# Patient Record
Sex: FEMALE | Race: WHITE | NOT HISPANIC OR LATINO | Employment: FULL TIME
[De-identification: names, ages, dates, MRNs, and addresses within clinical notes are randomized per-mention and may not be internally consistent; named-entity substitution may affect disease eponyms.]

---

## 2017-07-08 ENCOUNTER — HEALTH MAINTENANCE LETTER (OUTPATIENT)
Age: 42
End: 2017-07-08

## 2018-01-19 ENCOUNTER — ONCOLOGY VISIT (OUTPATIENT)
Dept: ONCOLOGY | Facility: CLINIC | Age: 43
End: 2018-01-19
Attending: INTERNAL MEDICINE
Payer: COMMERCIAL

## 2018-01-19 VITALS
WEIGHT: 192.5 LBS | TEMPERATURE: 98.3 F | OXYGEN SATURATION: 100 % | HEART RATE: 90 BPM | DIASTOLIC BLOOD PRESSURE: 74 MMHG | HEIGHT: 72 IN | SYSTOLIC BLOOD PRESSURE: 135 MMHG | BODY MASS INDEX: 26.07 KG/M2

## 2018-01-19 DIAGNOSIS — R00.2 PALPITATIONS: ICD-10-CM

## 2018-01-19 DIAGNOSIS — Z91.89 AT HIGH RISK FOR BREAST CANCER: ICD-10-CM

## 2018-01-19 DIAGNOSIS — M85.831 OSTEOPENIA OF RIGHT FOREARM: ICD-10-CM

## 2018-01-19 DIAGNOSIS — Z09 CHEMOTHERAPY FOLLOW-UP EXAMINATION: ICD-10-CM

## 2018-01-19 DIAGNOSIS — R21 RASH AND NONSPECIFIC SKIN ERUPTION: ICD-10-CM

## 2018-01-19 PROCEDURE — G0463 HOSPITAL OUTPT CLINIC VISIT: HCPCS | Mod: ZF

## 2018-01-19 RX ORDER — AZELASTINE 1 MG/ML
1 SPRAY, METERED NASAL
COMMUNITY
Start: 2017-02-03

## 2018-01-19 RX ORDER — TRIAMCINOLONE ACETONIDE 1 MG/G
CREAM TOPICAL
COMMUNITY
Start: 2017-08-08 | End: 2020-09-18

## 2018-01-19 ASSESSMENT — PAIN SCALES - GENERAL: PAINLEVEL: NO PAIN (0)

## 2018-01-19 NOTE — MR AVS SNAPSHOT
After Visit Summary   1/19/2018    Jairo Prieto    MRN: [id number]           Patient Information     Date Of Birth          1975        Visit Information        Provider Department      1/19/2018 11:00 AM Home Brooks MD [facility]        Today's Diagnoses     Radiation exposure    -  1    Chemotherapy follow-up examination        Palpitations        At high risk for breast cancer        Osteopenia of right forearm        Rash and nonspecific skin eruption           Follow-ups after your visit        Follow-up notes from your care team     Return in about 1 year (around 1/19/2019).      Who to contact     If you have questions or need follow up information about today's clinic visit or your schedule please contact [facility] directly at No information on file..  Normal or non-critical lab and imaging results will be communicated to you by [employer], letter or phone within 4 business days after the clinic has received the results. If you do not hear from us within 7 days, please contact the clinic through [employer] LS15[employer] or phone. If you have a critical or abnormal lab result, we will notify you by phone as soon as possible.  Submit refill requests through [employer] or call your pharmacy and they will forward the refill request to us. Please allow 3 business days for your refill to be completed.          Additional Information About Your Visit        [employer] Information     [employer] gives you secure access to your electronic health record. If you see a primary care provider, you can also send messages to your care team and make appointments. If you have questions, please call your primary care clinic.  If you do not have a primary care provider, please call [phone] and they will assist you.        Care EveryWhere ID     This is your Care EveryWhere ID. This could be used by other organizations to access your [facility]  records  [id number]        Your Vitals Were     Pulse Temperature Height Last Period Pulse Oximetry BMI (Body Mass Index)    90 98.3  F (36.8  C) (Oral) 1.829 m (6') 01/02/2018 100% 26.11 kg/m2       Blood Pressure from Last 3 Encounters:   01/19/18 135/74   05/18/12 122/78    Weight from Last 3 Encounters:   01/19/18 87.3 kg (192 lb 8 oz)   05/18/12 73.4 kg (161 lb 12.8 oz)              Today, you had the following     No orders found for display         Today's Medication Changes          These changes are accurate as of: 1/19/18  1:14 PM.  If you have any questions, ask your nurse or doctor.               These medicines have changed or have updated prescriptions.        Dose/Directions    OMEGA-3 FATTY ACIDS PO   This may have changed:  Another medication with the same name was removed. Continue taking this medication, and follow the directions you see here.   Changed by:  Home Brooks MD        Dose:  2 g   Take 2 g by mouth   Refills:  0         Stop taking these medicines if you haven't already. Please contact your care team if you have questions.     ASPIRIN   Stopped by:  Home Brooks MD           folic acid 400 MCG tablet   Commonly known as:  FOLVITE   Stopped by:  Home Brooks MD                    Primary Care Provider Office Phone # Fax #    Renee Rider [phone]       [facility] [address] [facility] [address]        Equal Access to Services     [facility]: Hadii gigi mcgoverno Soeunice, waaxda luqadaha, qaybta kaalmada adeegyada, waxay derick can adekelton curran . So [facility] [phone].    ATENCIÓN: Si habla español, tiene a ramos disposición servicios gratuitos de asistencia lingüística. Llame al [phone].    We comply with applicable federal civil rights laws and [place] laws. We do not discriminate on the basis of race, color, national origin, age, disability, sex, sexual orientation, or gender identity.             Thank you!     Thank you for choosing [facility]  for your care. Our goal is always to provide you with excellent care. Hearing back from our patients is one way we can continue to improve our services. Please take a few minutes to complete the written survey that you may receive in the mail after your visit with us. Thank you!             Your Updated Medication List - Protect others around you: Learn how to safely use, store and throw away your medicines at www.disposemymeds.org.          This list is accurate as of: 1/19/18  1:14 PM.  Always use your most recent med list.                   Brand Name Dispense Instructions for use Diagnosis    azelastine 0.1 % spray    ASTELIN     1 spray        calcium-vitamin D 500-125 MG-UNIT Tabs           Cholecalciferol 83502 UNITS Tabs      Take  by mouth. 3 wk followed by 1 week, 16 weeks total    Chemotherapy follow-up examination, Radiation exposure, NHL (non-Hodgkin's lymphoma) (H), Blood transfusion, without reported diagnosis       ibuprofen 200 MG tablet    ADVIL/MOTRIN     Take 200 mg by mouth every 4 hours as needed. Approx. 2 tabs per day    Chemotherapy follow-up examination, Radiation exposure, NHL (non-Hodgkin's lymphoma) (H), Blood transfusion, without reported diagnosis       levothyroxine 150 MCG tablet    SYNTHROID/LEVOTHROID     Take 150 mcg by mouth daily.    Chemotherapy follow-up examination, Radiation exposure, NHL (non-Hodgkin's lymphoma) (H), Blood transfusion, without reported diagnosis       MULTI-VITAMIN PO      Take  by mouth. 2 tabs per day    Chemotherapy follow-up examination, Radiation exposure, NHL (non-Hodgkin's lymphoma) (H), Blood transfusion, without reported diagnosis       OMEGA-3 FATTY ACIDS PO      Take 2 g by mouth        triamcinolone 0.1 % cream    KENALOG     Apply to affected areas bid as needed itching        ZYRTEC ALLERGY 10 MG tablet   Generic drug:  cetirizine      Take 10 mg by mouth daily as  needed.    Chemotherapy follow-up examination, Radiation exposure, NHL (non-Hodgkin's lymphoma) (H), Blood transfusion, without reported diagnosis

## 2018-01-19 NOTE — LETTER
1/19/2018    RE: Jairo Prieto  [address]  [place] [address]     Dear Dr. Vogel,   I had the pleasure of seeing your patient, Jairo Prieto, at the [facility] today, January 19, 2018. As you know, she is a thriving 41 yo woman now over 31 years s/p treatment for T-Cell Lymphoblastic Lymphoma. I will summarize my assessment and recommendations below.    Cancer Diagnosis    Diagnosis:  T cell lymphoblastic lymphoma   Date of Diagnosis:  7/24/1985 Age at Diagnosis:  9 years Date Therapy Completed: 2/1987   Sites involved/stage/diagnostic details: mediastinum, CNS-, BM- Laterality:  right   Hereditary/congenital history:    Pertinent past medical history:       Treatment Center:   [facility] Medical Record #:  [id number]   MD/APN Contact Information:  Dr. Keven Lino   Relapse(s)  NONE   Subsequent Malignant Neoplasm - NONE   CANCER TREATMENT SUMMARY    Protocol     Acronym/Number Title/Description Initiated Completed On-Study   [facility] Newly diagnosed NHL 7/25/1985 2/1987 NO   Surgery    Date  Procedure Site (if applicable) Laterality (if applicable) Surgeon/Institution   7/24/1985 Fine needle aspirate Anterior mediastinum right [facility]   Chemotherapy      Drug Name Route Cumulative Dose   Cyclophosphamide  Prednisone  Vincristine  Daunorubicin  Cytarabine  Methotrexate  6 Thioguanine  L asparaginase  BCNU  Hydroxyurea IV  PO  IV  IV  IV/IT  IT/PO  PO  IM  IV  PO 6 GM/m2      360 mg/m2  5800 mg/m2 (IV)/77 mg/m2 (IT)  182 mg/m2 (IT)      270 mg/m27    **all doses are estimated**   Bioimmunotherapy - NONE   Radiation  YES   Site/Field Laterality Start Stop Fractions Dose per Fraction (cGy) Total Dose (cGy) Type   Mediastinum Anterior/posterior 11/1/1985 11/15/1985 10 150 cGy 1500 cGy    Radiation oncologist:  Keven Verdin Institution:  [facility] [place]     Transplant  None   Complications/Late Effects   "    Problem Date onset Date resolved Status   Thyroid Nodules- now s/p total thyroidectomy 5/18/2012  Levothyroxine replacement   Adverse Drug Reactions/Allergies  - NONE    Additional Information/Comments    Labs/studies to consider:  TSH yearly, needs ECHO every 1-2 years, mammography and breast MRI yearly, consider   PFTs 6/25/2015 WNL; repeat PRN  Immunization status?  Hep and HIV serologies negative in 2012     Summary prepared by:  Jessica Murray Date prepared:  5/4/2012   Summary updated by: Jessica Murray Date updated: 1/18/2018     Interval History  Ms. Jerardo Prieto is doing well but has not been seen in [facility] clinic for 6 yrs due to a change in her insurance plan. She's been well but ROS as below remarkable for palpitations nearly every night. No chest pain, dyspnea, decreased exercise tolerance. Also has had a wrist fx in past- no DEXA on record- 2 episodes of low vitamin D on lab testing. Also has had CTA of neck to work up large left external jugular vein- unremarkable study. No history of clots.  Her main reported concern is her ongoing rash of 5 mos. Had odd circular lesions on upper extremities prior to onset of pruritic generalized dermatitis. She describes the circular rash as \"vascular\" as there was no palpable skin change. I reviewed the photos on her phone- not classic livedo reticularis and not classic Lyme target lesions. Has seen Dermatology and had a biopsy showing spongioform dermatitis. Now 42, has not had breast MRI screening despite high-risk status.    PSH  Total thyroidectomy    Medications  Levothyroxine  OTC vitamin d, MVI, fish oil    Review Of Systems  Skin: rash, itching- 5 mos rash with skin biopsy showing spongioform dermatitis  Eyes: negative  Ears/Nose/Throat: sinus trouble  Respiratory: No shortness of breath, dyspnea on exertion, cough, or hemoptysis  Cardiovascular: positive for negative and palpitations  Gastrointestinal: negative  Genitourinary: " negative  Musculoskeletal: generalized aches, pains, stiffness in upper torso, neck, shoulders  Neurologic: negative  Psychiatric: negative specifically denies PTSH, MELIA, MDD symptoms  Hematologic/Lymphatic/Immunologic: negative  Endocrine: negative    Family History  PGM  of breast cancer- age 50    Social History  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 Work station not ergonomically optimized (question whether her upper body/neck myofascial symptoms relate to this)  Feels safe at home.  No tobacco or drugs.  Excess alcohol use- no FH of alcoholism. Discussed high risk markers (>14 drinks/wk or >4 drinks/d)  1 cat, 1 dog.  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    Examination  /74 (BP Location: Right arm, Patient Position: Sitting, Cuff Size: Adult Regular)  Pulse 90  Temp 98.3  F (36.8  C) (Oral)  Ht 1.829 m (6')  Wt 87.3 kg (192 lb 8 oz)  LMP 2018  SpO2 100%  BMI 26.11 kg/m2  EXAM:  Constitutional: healthy, alert and no distress  Head: Normocephalic. No masses, lesions, tenderness or abnormalities  Neck: Neck supple. No adenopathy. Thyroid symmetric, normal size,, Carotids without bruits.  ENT: ENT exam normal, no neck nodes or sinus tenderness  Cardiovascular: negative, PMI normal. No lifts, heaves, or thrills. RRR. No murmurs, clicks gallops or rub  Respiratory: negative, Percussion normal. Good diaphragmatic excursion. Lungs clear  Gastrointestinal: Abdomen soft, non-tender. BS normal. No masses, organomegaly  : Deferred  Musculoskeletal: extremities normal- no gross deformities noted, gait normal and normal muscle tone  Skin: excoriation - upper extremities, exanthem upper chest. No dermatographism but some eczematous changes on external ears.   Neurologic: Gait normal. Reflexes normal and symmetric. Sensation grossly WNL.  Psychiatric: mentation appears normal and affect  normal/bright  Hematologic/Lymphatic/Immunologic: Normal cervical lymph nodes    Assessment and Plan:  Ms. Kurtz is a healthy 43 yo seen for longterm follow-up of her treatment for Non-Hodgkins Lymphoma.  She is doing well but will need surveillance studies and on-going screening based on the treatment she received.     1)  Non-Hodgkin Lymphoma.  She is now over 30 years s/p completion of therapy so the risk of recurrence is exceedingly low.  No family history to suggest a family cancer syndrome but the early breast cancer in her paternal grandmother increases her already elevated risk.     2)  Chemotherapy late effects.  The cyclophosphamide can effect the body in many ways, but this far out from treatment the reproductive system and the urinary tract are the main systems we worry about.  Given the normal onset of menstruation and normal pubertal development, ovarian function was preserved but fertility was likely affected.  As for the urinary tract, microhematuria is common and should be checked annually with urinalysis.  Kidney dysfunction is more rare and we'd recommend annual BP screening and creatinine measurement for any hypertension.  The anthracycline exposure (daunomycin) does predispose to late cardiac effects.  The cumulative dose and coincident radiation therapy merit annual echocardiogram to make sure no cardiomyopathy develops. This  should be scheduled. In addition, the reported palpitations merit Holter monitoring in this setting. Finally, the intra-thecal methotrexate can cause changes in cognition and learning disabilities.  Ms. uKrtz has clearly done well in school and is doing well at work - she denies any PTSD, anxiety or depression symptoms. If any changes occur, we'd recommend formal Neuropsych testing. Lastly, the prednisone and MTX can affect bone development and particularly in light of her h/o wrist fracture, would recommend a baseline DEXA scan now.     3)  Radiation exposure.   Ms. Kurtz received mantle irradiation which exposes the chest, breasts, heart and lungs to radiation.  Thus, there is an increased risk of problems with these organs including second cancers and fibrosis.  At present, there is no indication of any problems.  We recommend aggressive breast cancer screening given the fact that 25% of young women receiving this radiation exposure may develop a breast cancer by age 45. In addition, her family history increases this risk further. Thus annual breast MRI is indicated and we recommend annual mammography as well (6 mos apart from the MRI).  Ms. Kurtz has fibrocystic changes in both breasts so SBE is less useful.  We also recommend avoiding sun exposure to the radiated area and close follow up of any moles in these areas.  We  ordered pulmonary function tests on entry into [facility] in 2012 and these were normal so don't need further testing unless symptoms develop.  Also, we discussed the increased risk of coronary artery disease after mantle radiation and the importance of other risk factor reduction (cholesterol and blood pressure) and daily exercise.       4)  Other  related follow-up issues.  Dental late effects are common and she's had some receding gums but o/w good dental health.  Eye issues are less common but we'd recommend a baseline eye exam and glaucoma screening. She received blood transfusions during her treatment and is Hepatitis C and HIV negative (tested in 2012)    5) Rash. The rash is pruritic and has been persistent for over 5 mos. Her h/o eczema suggests a pre-disposition to atopy and the biopsy is reassuring but if the patch testing is negative and symptoms continue, would consider a second opinion at a referral center with review of the original dermatopath sample. Lencho Reeves MD, here at the [facility] would be my recommendation. I would also consider serum tryptase measurement looking for abnormal mast cell activation.     6)  Healthcare Maintenance. Ms. Jerardo Prieto has established primary care which is critical. I would like for her to get her testing through her usual clinic if possible- but if Dr. Vogel prefers, am also happy to order it all here. She requires prior authorization for all testing.    In summary, I would recommend the following testing be ordered:    A) Breast MRI/alternating with Mammography for HIGH-RISK breast cancer screening  B) DEXA scan for risk factors for osteoporosis in setting of past wrist fx with osteopenia on Xray at that time  C) Echocardiogram annually (high risk for cardiomyopathy after XRT and anthracycline therapy) and Holter monitor for palpitations.  D) Dermatology referral to Lencho Reeves MD at [facility] with consideration of testing for Celiac disease (TTG abs) and serum Tryptase if patch testing does not reveal a clear allergic trigger.    It was a pleasure to meet Ms. Kurtz today and to be involved in her care.  Do not hesitate to contact me with any questions regarding this evaluation and my recommendations.    Sincerely,     Home Brooks MD, FACP, FAAP

## 2018-01-19 NOTE — LETTER
1/19/2018      RE: Jairo Prieto  [address] [place] [address]     Dear Colleague,    Thank you for referring your patient, Jairo Prieto, to the [facility]. Please see a copy of my visit note below.    Dear Dr. Vogel,   I had the pleasure of seeing your patient, Jairo Prieto, at the [facility] today, January 19, 2018. As you know, she is a thriving 41 yo woman now over 31 years s/p treatment for T-Cell Lymphoblastic Lymphoma. I will summarize my assessment and recommendations below.    Cancer Diagnosis    Diagnosis:  T cell lymphoblastic lymphoma   Date of Diagnosis:  7/24/1985 Age at Diagnosis:  9 years Date Therapy Completed: 2/1987   Sites involved/stage/diagnostic details: mediastinum, CNS-, BM- Laterality:  right   Hereditary/congenital history:    Pertinent past medical history:       Treatment Center:   [facility] Medical Record #:  [id number]   MD/APN Contact Information:  Dr. eKven Lino   Relapse(s)  NONE   Subsequent Malignant Neoplasm - NONE   CANCER TREATMENT SUMMARY    Protocol     Acronym/Number Title/Description Initiated Completed On-Study   CC[facility] 502 Newly diagnosed NHL 7/25/1985 2/1987 NO   Surgery    Date  Procedure Site (if applicable) Laterality (if applicable) Surgeon/Institution   7/24/1985 Fine needle aspirate Anterior mediastinum right [facility]   Chemotherapy      Drug Name Route Cumulative Dose   Cyclophosphamide  Prednisone  Vincristine  Daunorubicin  Cytarabine  Methotrexate  6 Thioguanine  L asparaginase  BCNU  Hydroxyurea IV  PO  IV  IV  IV/IT  IT/PO  PO  IM  IV  PO 6 GM/m2      360 mg/m2  5800 mg/m2 (IV)/77 mg/m2 (IT)  182 mg/m2 (IT)      270 mg/m27    **all doses are estimated**   Bioimmunotherapy - NONE   Radiation  YES   Site/Field Laterality Start Stop Fractions Dose per Fraction (cGy) Total Dose (cGy) Type   Mediastinum Anterior/posterior  "11/1/1985 11/15/1985 10 150 cGy 1500 cGy    Radiation oncologist:  Keven Verdin Institution:  [facility] [place]     Transplant  None   Complications/Late Effects      Problem Date onset Date resolved Status   Thyroid Nodules- now s/p total thyroidectomy 5/18/2012  Levothyroxine replacement   Adverse Drug Reactions/Allergies  - NONE    Additional Information/Comments    Labs/studies to consider:  TSH yearly, needs ECHO every 1-2 years, mammography and breast MRI yearly, consider   PFTs 6/25/2015 WNL; repeat PRN  Immunization status?  Hep and HIV serologies negative in 2012     Summary prepared by:  Jessica Murray Date prepared:  5/4/2012   Summary updated by: Jessica Murray Date updated: 1/18/2018     Interval History  Ms. Jerardo Prieto is doing well but has not been seen in [facility] clinic for 6 yrs due to a change in her insurance plan. She's been well but ROS as below remarkable for palpitations nearly every night. No chest pain, dyspnea, decreased exercise tolerance. Also has had a wrist fx in past- no DEXA on record- 2 episodes of low vitamin D on lab testing. Also has had CTA of neck to work up large left external jugular vein- unremarkable study. No history of clots.  Her main reported concern is her ongoing rash of 5 mos. Had odd circular lesions on upper extremities prior to onset of pruritic generalized dermatitis. She describes the circular rash as \"vascular\" as there was no palpable skin change. I reviewed the photos on her phone- not classic livedo reticularis and not classic Lyme target lesions. Has seen Dermatology and had a biopsy showing spongioform dermatitis. Now 42, has not had breast MRI screening despite high-risk status.    PSH  Total thyroidectomy    Medications  Levothyroxine  OTC vitamin d, MVI, fish oil    Review Of Systems  Skin: rash, itching- 5 mos rash with skin biopsy showing spongioform dermatitis  Eyes: negative  Ears/Nose/Throat: sinus trouble  Respiratory: No shortness " of breath, dyspnea on exertion, cough, or hemoptysis  Cardiovascular: positive for negative and palpitations  Gastrointestinal: negative  Genitourinary: negative  Musculoskeletal: generalized aches, pains, stiffness in upper torso, neck, shoulders  Neurologic: negative  Psychiatric: negative specifically denies PTSH, MELIA, MDD symptoms  Hematologic/Lymphatic/Immunologic: negative  Endocrine: negative    Family History  PGM  of breast cancer- age 50    Social History  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 Work station not ergonomically optimized (question whether her upper body/neck myofascial symptoms relate to this)  Feels safe at home.  No tobacco or drugs.  Excess alcohol use- no FH of alcoholism. Discussed high risk markers (>14 drinks/wk or >4 drinks/d)  1 cat, 1 dog.  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    Examination  /74 (BP Location: Right arm, Patient Position: Sitting, Cuff Size: Adult Regular)  Pulse 90  Temp 98.3  F (36.8  C) (Oral)  Ht 1.829 m (6')  Wt 87.3 kg (192 lb 8 oz)  LMP 2018  SpO2 100%  BMI 26.11 kg/m2  EXAM:  Constitutional: healthy, alert and no distress  Head: Normocephalic. No masses, lesions, tenderness or abnormalities  Neck: Neck supple. No adenopathy. Thyroid symmetric, normal size,, Carotids without bruits.  ENT: ENT exam normal, no neck nodes or sinus tenderness  Cardiovascular: negative, PMI normal. No lifts, heaves, or thrills. RRR. No murmurs, clicks gallops or rub  Respiratory: negative, Percussion normal. Good diaphragmatic excursion. Lungs clear  Gastrointestinal: Abdomen soft, non-tender. BS normal. No masses, organomegaly  : Deferred  Musculoskeletal: extremities normal- no gross deformities noted, gait normal and normal muscle tone  Skin: excoriation - upper extremities, exanthem upper chest. No dermatographism but some eczematous changes on external ears.   Neurologic:  Gait normal. Reflexes normal and symmetric. Sensation grossly WNL.  Psychiatric: mentation appears normal and affect normal/bright  Hematologic/Lymphatic/Immunologic: Normal cervical lymph nodes    Assessment and Plan:  Ms. Kurtz is a healthy 41 yo seen for longterm follow-up of her treatment for Non-Hodgkins Lymphoma.  She is doing well but will need surveillance studies and on-going screening based on the treatment she received.     1)  Non-Hodgkin Lymphoma.  She is now over 30 years s/p completion of therapy so the risk of recurrence is exceedingly low.  No family history to suggest a family cancer syndrome but the early breast cancer in her paternal grandmother increases her already elevated risk.     2)  Chemotherapy late effects.  The cyclophosphamide can effect the body in many ways, but this far out from treatment the reproductive system and the urinary tract are the main systems we worry about.  Given the normal onset of menstruation and normal pubertal development, ovarian function was preserved but fertility was likely affected.  As for the urinary tract, microhematuria is common and should be checked annually with urinalysis.  Kidney dysfunction is more rare and we'd recommend annual BP screening and creatinine measurement for any hypertension.  The anthracycline exposure (daunomycin) does predispose to late cardiac effects.  The cumulative dose and coincident radiation therapy merit annual echocardiogram to make sure no cardiomyopathy develops. This  should be scheduled. In addition, the reported palpitations merit Holter monitoring in this setting. Finally, the intra-thecal methotrexate can cause changes in cognition and learning disabilities.  Ms. Kurtz has clearly done well in school and is doing well at work - she denies any PTSD, anxiety or depression symptoms. If any changes occur, we'd recommend formal Neuropsych testing. Lastly, the prednisone and MTX can affect bone development and  particularly in light of her h/o wrist fracture, would recommend a baseline DEXA scan now.     3)  Radiation exposure.  Ms. Kurtz received mantle irradiation which exposes the chest, breasts, heart and lungs to radiation.  Thus, there is an increased risk of problems with these organs including second cancers and fibrosis.  At present, there is no indication of any problems.  We recommend aggressive breast cancer screening given the fact that 25% of young women receiving this radiation exposure may develop a breast cancer by age 45. In addition, her family history increases this risk further. Thus annual breast MRI is indicated and we recommend annual mammography as well (6 mos apart from the MRI).  Ms. Kurtz has fibrocystic changes in both breasts so SBE is less useful.  We also recommend avoiding sun exposure to the radiated area and close follow up of any moles in these areas.  We  ordered pulmonary function tests on entry into [facility] in 2012 and these were normal so don't need further testing unless symptoms develop.  Also, we discussed the increased risk of coronary artery disease after mantle radiation and the importance of other risk factor reduction (cholesterol and blood pressure) and daily exercise.       4)  Other  related follow-up issues.  Dental late effects are common and she's had some receding gums but o/w good dental health.  Eye issues are less common but we'd recommend a baseline eye exam and glaucoma screening. She received blood transfusions during her treatment and is Hepatitis C and HIV negative (tested in 2012)    5) Rash. The rash is pruritic and has been persistent for over 5 mos. Her h/o eczema suggests a pre-disposition to atopy and the biopsy is reassuring but if the patch testing is negative and symptoms continue, would consider a second opinion at a referral center with review of the original dermatopath sample. Lencho Reeves MD, here at the [facility] would be my  recommendation. I would also consider serum tryptase measurement looking for abnormal mast cell activation.     6) Healthcare Maintenance. Ms. Jerardo Prieto has established primary care which is critical. I would like for her to get her testing through her usual clinic if possible- but if Dr. Vogel prefers, am also happy to order it all here. She requires prior authorization for all testing.    In summary, I would recommend the following testing be ordered:    A) Breast MRI/alternating with Mammography for HIGH-RISK breast cancer screening  B) DEXA scan for risk factors for osteoporosis in setting of past wrist fx with osteopenia on Xray at that time  C) Echocardiogram annually (high risk for cardiomyopathy after XRT and anthracycline therapy) and Holter monitor for palpitations.  D) Dermatology referral to Lencho Reeves MD at [facility] with consideration of testing for Celiac disease (TTG abs) and serum Tryptase if patch testing does not reveal a clear allergic trigger.    It was a pleasure to meet Ms. Kurtz today and to be involved in her care.  Do not hesitate to contact me with any questions regarding this evaluation and my recommendations.    Sincerely,     Home Brooks MD, FACP, FAAP

## 2018-01-19 NOTE — LETTER
1/19/2018      RE: Jairo Prieto  [address]  [place] [address]       Dear Dr. Vogel,   I had the pleasure of seeing your patient, Jairo Prieto, at the [facility] today, January 19, 2018. As you know, she is a thriving 41 yo woman now over 31 years s/p treatment for T-Cell Lymphoblastic Lymphoma. I will summarize my assessment and recommendations below.    Cancer Diagnosis    Diagnosis:  T cell lymphoblastic lymphoma   Date of Diagnosis:  7/24/1985 Age at Diagnosis:  9 years Date Therapy Completed: 2/1987   Sites involved/stage/diagnostic details: mediastinum, CNS-, BM- Laterality:  right   Hereditary/congenital history:    Pertinent past medical history:       Treatment Center:   [facility] Medical Record #:  [id number]   MD/APN Contact Information:  Dr. Keven Lino   Relapse(s)  NONE   Subsequent Malignant Neoplasm - NONE   CANCER TREATMENT SUMMARY    Protocol     Acronym/Number Title/Description Initiated Completed On-Study   [facility] 502 Newly diagnosed NHL 7/25/1985 2/1987 NO   Surgery    Date  Procedure Site (if applicable) Laterality (if applicable) Surgeon/Institution   7/24/1985 Fine needle aspirate Anterior mediastinum right [facility]   Chemotherapy      Drug Name Route Cumulative Dose   Cyclophosphamide  Prednisone  Vincristine  Daunorubicin  Cytarabine  Methotrexate  6 Thioguanine  L asparaginase  BCNU  Hydroxyurea IV  PO  IV  IV  IV/IT  IT/PO  PO  IM  IV  PO 6 GM/m2      360 mg/m2  5800 mg/m2 (IV)/77 mg/m2 (IT)  182 mg/m2 (IT)      270 mg/m27    **all doses are estimated**   Bioimmunotherapy - NONE   Radiation  YES   Site/Field Laterality Start Stop Fractions Dose per Fraction (cGy) Total Dose (cGy) Type   Mediastinum Anterior/posterior 11/1/1985 11/15/1985 10 150 cGy 1500 cGy    Radiation oncologist:  Keven Verdin Institution:  [facility] [place]     Transplant  None   Complications/Late  "Effects      Problem Date onset Date resolved Status   Thyroid Nodules- now s/p total thyroidectomy 5/18/2012  Levothyroxine replacement   Adverse Drug Reactions/Allergies  - NONE    Additional Information/Comments    Labs/studies to consider:  TSH yearly, needs ECHO every 1-2 years, mammography and breast MRI yearly, consider   PFTs 6/25/2015 WNL; repeat PRN  Immunization status?  Hep and HIV serologies negative in 2012     Summary prepared by:  Jessica Murray Date prepared:  5/4/2012   Summary updated by: Jessica Murray Date updated: 1/18/2018     Interval History  Ms. Jerardo Prieto is doing well but has not been seen in [facility] clinic for 6 yrs due to a change in her insurance plan. She's been well but ROS as below remarkable for palpitations nearly every night. No chest pain, dyspnea, decreased exercise tolerance. Also has had a wrist fx in past- no DEXA on record- 2 episodes of low vitamin D on lab testing. Also has had CTA of neck to work up large left external jugular vein- unremarkable study. No history of clots.  Her main reported concern is her ongoing rash of 5 mos. Had odd circular lesions on upper extremities prior to onset of pruritic generalized dermatitis. She describes the circular rash as \"vascular\" as there was no palpable skin change. I reviewed the photos on her phone- not classic livedo reticularis and not classic Lyme target lesions. Has seen Dermatology and had a biopsy showing spongioform dermatitis. Now 42, has not had breast MRI screening despite high-risk status.    PSH  Total thyroidectomy    Medications  Levothyroxine  OTC vitamin d, MVI, fish oil    Review Of Systems  Skin: rash, itching- 5 mos rash with skin biopsy showing spongioform dermatitis  Eyes: negative  Ears/Nose/Throat: sinus trouble  Respiratory: No shortness of breath, dyspnea on exertion, cough, or hemoptysis  Cardiovascular: positive for negative and palpitations  Gastrointestinal: negative  Genitourinary: " negative  Musculoskeletal: generalized aches, pains, stiffness in upper torso, neck, shoulders  Neurologic: negative  Psychiatric: negative specifically denies PTSH, MELIA, MDD symptoms  Hematologic/Lymphatic/Immunologic: negative  Endocrine: negative    Family History  PGM  of breast cancer- age 50    Social History  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 Work station not ergonomically optimized (question whether her upper body/neck myofascial symptoms relate to this)  Feels safe at home.  No tobacco or drugs.  Excess alcohol use- no FH of alcoholism. Discussed high risk markers (>14 drinks/wk or >4 drinks/d)  1 cat, 1 dog.  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    Examination  /74 (BP Location: Right arm, Patient Position: Sitting, Cuff Size: Adult Regular)  Pulse 90  Temp 98.3  F (36.8  C) (Oral)  Ht 1.829 m (6')  Wt 87.3 kg (192 lb 8 oz)  LMP 2018  SpO2 100%  BMI 26.11 kg/m2  EXAM:  Constitutional: healthy, alert and no distress  Head: Normocephalic. No masses, lesions, tenderness or abnormalities  Neck: Neck supple. No adenopathy. Thyroid symmetric, normal size,, Carotids without bruits.  ENT: ENT exam normal, no neck nodes or sinus tenderness  Cardiovascular: negative, PMI normal. No lifts, heaves, or thrills. RRR. No murmurs, clicks gallops or rub  Respiratory: negative, Percussion normal. Good diaphragmatic excursion. Lungs clear  Gastrointestinal: Abdomen soft, non-tender. BS normal. No masses, organomegaly  : Deferred  Musculoskeletal: extremities normal- no gross deformities noted, gait normal and normal muscle tone  Skin: excoriation - upper extremities, exanthem upper chest. No dermatographism but some eczematous changes on external ears.   Neurologic: Gait normal. Reflexes normal and symmetric. Sensation grossly WNL.  Psychiatric: mentation appears normal and affect  normal/bright  Hematologic/Lymphatic/Immunologic: Normal cervical lymph nodes    Assessment and Plan:  Ms. Kurtz is a healthy 41 yo seen for longterm follow-up of her treatment for Non-Hodgkins Lymphoma.  She is doing well but will need surveillance studies and on-going screening based on the treatment she received.     1)  Non-Hodgkin Lymphoma.  She is now over 30 years s/p completion of therapy so the risk of recurrence is exceedingly low.  No family history to suggest a family cancer syndrome but the early breast cancer in her paternal grandmother increases her already elevated risk.     2)  Chemotherapy late effects.  The cyclophosphamide can effect the body in many ways, but this far out from treatment the reproductive system and the urinary tract are the main systems we worry about.  Given the normal onset of menstruation and normal pubertal development, ovarian function was preserved but fertility was likely affected.  As for the urinary tract, microhematuria is common and should be checked annually with urinalysis.  Kidney dysfunction is more rare and we'd recommend annual BP screening and creatinine measurement for any hypertension.  The anthracycline exposure (daunomycin) does predispose to late cardiac effects.  The cumulative dose and coincident radiation therapy merit annual echocardiogram to make sure no cardiomyopathy develops. This  should be scheduled. In addition, the reported palpitations merit Holter monitoring in this setting. Finally, the intra-thecal methotrexate can cause changes in cognition and learning disabilities.  Ms. Kurtz has clearly done well in school and is doing well at work - she denies any PTSD, anxiety or depression symptoms. If any changes occur, we'd recommend formal Neuropsych testing. Lastly, the prednisone and MTX can affect bone development and particularly in light of her h/o wrist fracture, would recommend a baseline DEXA scan now.     3)  Radiation exposure.   Ms. Kurtz received mantle irradiation which exposes the chest, breasts, heart and lungs to radiation.  Thus, there is an increased risk of problems with these organs including second cancers and fibrosis.  At present, there is no indication of any problems.  We recommend aggressive breast cancer screening given the fact that 25% of young women receiving this radiation exposure may develop a breast cancer by age 45. In addition, her family history increases this risk further. Thus annual breast MRI is indicated and we recommend annual mammography as well (6 mos apart from the MRI).  Ms. Kurtz has fibrocystic changes in both breasts so SBE is less useful.  We also recommend avoiding sun exposure to the radiated area and close follow up of any moles in these areas.  We  ordered pulmonary function tests on entry into [facility] in 2012 and these were normal so don't need further testing unless symptoms develop.  Also, we discussed the increased risk of coronary artery disease after mantle radiation and the importance of other risk factor reduction (cholesterol and blood pressure) and daily exercise.       4)  Other  related follow-up issues.  Dental late effects are common and she's had some receding gums but o/w good dental health.  Eye issues are less common but we'd recommend a baseline eye exam and glaucoma screening. She received blood transfusions during her treatment and is Hepatitis C and HIV negative (tested in 2012)    5) Rash. The rash is pruritic and has been persistent for over 5 mos. Her h/o eczema suggests a pre-disposition to atopy and the biopsy is reassuring but if the patch testing is negative and symptoms continue, would consider a second opinion at a referral center with review of the original dermatopath sample. Lencho Reeves MD, here at the [facility] would be my recommendation. I would also consider serum tryptase measurement looking for abnormal mast cell activation.     6)  Healthcare Maintenance. Ms. Jerardo Prieto has established primary care which is critical. I would like for her to get her testing through her usual clinic if possible- but if Dr. Vogel prefers, am also happy to order it all here. She requires prior authorization for all testing.    In summary, I would recommend the following testing be ordered:    A) Breast MRI/alternating with Mammography for HIGH-RISK breast cancer screening  B) DEXA scan for risk factors for osteoporosis in setting of past wrist fx with osteopenia on Xray at that time  C) Echocardiogram annually (high risk for cardiomyopathy after XRT and anthracycline therapy) and Holter monitor for palpitations.  D) Dermatology referral to Lencho Reeves MD at [facility] with consideration of testing for Celiac disease (TTG abs) and serum Tryptase if patch testing does not reveal a clear allergic trigger.    It was a pleasure to meet Ms. Kurtz today and to be involved in her care.  Do not hesitate to contact me with any questions regarding this evaluation and my recommendations.    Sincerely,     Home Brooks MD, FACP, FAAP    Home Brooks MD

## 2018-01-19 NOTE — PROGRESS NOTES
Dear Dr. Vogel,   I had the pleasure of seeing your patient, Jairo Prieto, at the [facility] Childhood Cancer Survivors today, January 19, 2018. As you know, she is a thriving 41 yo woman now over 31 years s/p treatment for T-Cell Lymphoblastic Lymphoma. I will summarize my assessment and recommendations below.    Cancer Diagnosis    Diagnosis:  T cell lymphoblastic lymphoma   Date of Diagnosis:  7/24/1985 Age at Diagnosis:  9 years Date Therapy Completed: 2/1987   Sites involved/stage/diagnostic details: mediastinum, CNS-, BM- Laterality:  right   Hereditary/congenital history:    Pertinent past medical history:       Treatment Center:   [facility] Medical Record #:  [id number]   MD/APN Contact Information:  Dr. Keven Lino   Relapse(s)  NONE   Subsequent Malignant Neoplasm - NONE   CANCER TREATMENT SUMMARY    Protocol     Acronym/Number Title/Description Initiated Completed [facility]   [facility]25/1985 2/1987 NO   Surgery    Date  Procedure Site (if applicable) Laterality (if applicable) Surgeon/Institution   7/24/1985 Fine needle aspirate Anterior mediastinum right [facility]   Chemotherapy      Drug Name Route Cumulative Dose   Cyclophosphamide  Prednisone  Vincristine  Daunorubicin  Cytarabine  Methotrexate  6 Thioguanine  L asparaginase  BCNU  Hydroxyurea IV  PO  IV  IV  IV/IT  IT/PO  PO  IM  IV  PO 6 GM/m2      360 mg/m2  5800 mg/m2 (IV)/77 mg/m2 (IT)  182 mg/m2 (IT)      270 mg/m27    **all doses are estimated**   Bioimmunotherapy - NONE   Radiation  YES   Site/Field Laterality Start Stop Fractions Dose per Fraction (cGy) Total Dose (cGy) Type   Mediastinum Anterior/posterior 11/1/1985 11/15/1985 10 150 cGy 1500 cGy    Radiation oncologist:  Keven Verdin Institution:  [facility] [place]     Transplant  None   Complications/Late Effects      Problem Date onset Date resolved Status   Thyroid Nodules- now s/p total  "thyroidectomy 5/18/2012  Levothyroxine replacement   Adverse Drug Reactions/Allergies  - NONE    Additional Information/Comments    Labs/studies to consider:  TSH yearly, needs ECHO every 1-2 years, mammography and breast MRI yearly, consider   PFTs 6/25/2015 WNL; repeat PRN  Immunization status?  Hep and HIV serologies negative in 2012     Summary prepared by:  Jessica Murray Date prepared:  5/4/2012   Summary updated by: Jessica Murray Date updated: 1/18/2018     Interval History  Ms. Jerardo Prieto is doing well but has not been seen in [facility] clinic for 6 yrs due to a change in her insurance plan. She's been well but ROS as below remarkable for palpitations nearly every night. No chest pain, dyspnea, decreased exercise tolerance. Also has had a wrist fx in past- no DEXA on record- 2 episodes of low vitamin D on lab testing. Also has had CTA of neck to work up large left external jugular vein- unremarkable study. No history of clots.  Her main reported concern is her ongoing rash of 5 mos. Had odd circular lesions on upper extremities prior to onset of pruritic generalized dermatitis. She describes the circular rash as \"vascular\" as there was no palpable skin change. I reviewed the photos on her phone- not classic livedo reticularis and not classic Lyme target lesions. Has seen Dermatology and had a biopsy showing spongioform dermatitis. Now 42, has not had breast MRI screening despite high-risk status.    PSH  Total thyroidectomy    Medications  Levothyroxine  OTC vitamin d, MVI, fish oil    Review Of Systems  Skin: rash, itching- 5 mos rash with skin biopsy showing spongioform dermatitis  Eyes: negative  Ears/Nose/Throat: sinus trouble  Respiratory: No shortness of breath, dyspnea on exertion, cough, or hemoptysis  Cardiovascular: positive for negative and palpitations  Gastrointestinal: negative  Genitourinary: negative  Musculoskeletal: generalized aches, pains, stiffness in upper torso, neck, " shoulders  Neurologic: negative  Psychiatric: negative specifically denies PTSH, MELIA, MDD symptoms  Hematologic/Lymphatic/Immunologic: negative  Endocrine: negative    Family History  PGM  of breast cancer- age 50    Social History  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 Work station not ergonomically optimized (question whether her upper body/neck myofascial symptoms relate to this)  Feels safe at home.  No tobacco or drugs.  Excess alcohol use- no FH of alcoholism. Discussed high risk markers (>14 drinks/wk or >4 drinks/d)  1 cat, 1 dog.  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    Examination  /74 (BP Location: Right arm, Patient Position: Sitting, Cuff Size: Adult Regular)  Pulse 90  Temp 98.3  F (36.8  C) (Oral)  Ht 1.829 m (6')  Wt 87.3 kg (192 lb 8 oz)  LMP 2018  SpO2 100%  BMI 26.11 kg/m2  EXAM:  Constitutional: healthy, alert and no distress  Head: Normocephalic. No masses, lesions, tenderness or abnormalities  Neck: Neck supple. No adenopathy. Thyroid symmetric, normal size,, Carotids without bruits.  ENT: ENT exam normal, no neck nodes or sinus tenderness  Cardiovascular: negative, PMI normal. No lifts, heaves, or thrills. RRR. No murmurs, clicks gallops or rub  Respiratory: negative, Percussion normal. Good diaphragmatic excursion. Lungs clear  Gastrointestinal: Abdomen soft, non-tender. BS normal. No masses, organomegaly  : Deferred  Musculoskeletal: extremities normal- no gross deformities noted, gait normal and normal muscle tone  Skin: excoriation - upper extremities, exanthem upper chest. No dermatographism but some eczematous changes on external ears.   Neurologic: Gait normal. Reflexes normal and symmetric. Sensation grossly WNL.  Psychiatric: mentation appears normal and affect normal/bright  Hematologic/Lymphatic/Immunologic: Normal cervical lymph nodes    Assessment and Plan:  Ms. Kurtz is  a healthy 41 yo seen for longterm follow-up of her treatment for Non-Hodgkins Lymphoma.  She is doing well but will need surveillance studies and on-going screening based on the treatment she received.     1)  Non-Hodgkin Lymphoma.  She is now over 30 years s/p completion of therapy so the risk of recurrence is exceedingly low.  No family history to suggest a family cancer syndrome but the early breast cancer in her paternal grandmother increases her already elevated risk.     2)  Chemotherapy late effects.  The cyclophosphamide can effect the body in many ways, but this far out from treatment the reproductive system and the urinary tract are the main systems we worry about.  Given the normal onset of menstruation and normal pubertal development, ovarian function was preserved but fertility was likely affected.  As for the urinary tract, microhematuria is common and should be checked annually with urinalysis.  Kidney dysfunction is more rare and we'd recommend annual BP screening and creatinine measurement for any hypertension.  The anthracycline exposure (daunomycin) does predispose to late cardiac effects.  The cumulative dose and coincident radiation therapy merit annual echocardiogram to make sure no cardiomyopathy develops. This should be scheduled. In addition, the reported palpitations merit Holter monitoring in this setting. Finally, the intra-thecal methotrexate can cause changes in cognition and learning disabilities.  Ms. Kurtz has clearly done well in school and is doing well at work - she denies any PTSD, anxiety or depression symptoms. If any changes occur, we'd recommend formal Neuropsych testing. Lastly, the prednisone and MTX can affect bone development and particularly in light of her h/o wrist fracture, would recommend a baseline DEXA scan now.     3)  Radiation exposure.  Ms. Kurtz received mantle irradiation which exposes the chest, breasts, heart and lungs to radiation.  Thus, there  is an increased risk of problems with these organs including second cancers and fibrosis.  At present, there is no indication of any problems.  We recommend aggressive breast cancer screening given the fact that 25% of young women receiving this radiation exposure may develop a breast cancer by age 45. In addition, her family history increases this risk further. Thus annual breast MRI is indicated and we recommend annual mammography as well (6 mos apart from the MRI).  Ms. Kurtz has fibrocystic changes in both breasts so SBE is less useful.  We also recommend avoiding sun exposure to the radiated area and close follow up of any moles in these areas.  We ordered pulmonary function tests on entry into [facility] in 2012 and these were normal so don't need further testing unless symptoms develop.  Also, we discussed the increased risk of coronary artery disease after mantle radiation and the importance of other risk factor reduction (cholesterol and blood pressure) and daily exercise.       4)  Other related follow-up issues.  Dental late effects are common and she's had some receding gums but o/w good dental health.  Eye issues are less common but we'd recommend a baseline eye exam and glaucoma screening. She received blood transfusions during her treatment and is Hepatitis C and HIV negative (tested in 2012)    5) Rash. The rash is pruritic and has been persistent for over 5 mos. Her h/o eczema suggests a pre-disposition to atopy and the biopsy is reassuring but if the patch testing is negative and symptoms continue, would consider a second opinion at a referral center with review of the original dermatopath sample. Lencho Reeves MD, here at the [facility] would be my recommendation. I would also consider serum tryptase measurement looking for abnormal mast cell activation.     6) Healthcare Maintenance. Ms. Jerardo Prieto has established primary care which is critical. I would like for her to get her  testing through her usual clinic if possible- but if Dr. Vogel prefers, am also happy to order it all here. She requires prior authorization for all testing.    In summary, I would recommend the following testing be ordered:    A) Breast MRI/alternating with Mammography for HIGH-RISK breast cancer screening  B) DEXA scan for risk factors for osteoporosis in setting of past wrist fx with osteopenia on Xray at that time  C) Echocardiogram annually (high risk for cardiomyopathy after XRT and anthracycline therapy) and Holter monitor for palpitations.  D) Dermatology referral to Lencho Reeves MD at [facility] with consideration of testing for Celiac disease (TTG abs) and serum Tryptase if patch testing does not reveal a clear allergic trigger.    It was a pleasure to meet Ms. Kurtz today and to be involved in her care.  Do not hesitate to contact me with any questions regarding this evaluation and my recommendations.    Sincerely,     Home Brooks MD, FACP, FAAP

## 2018-01-19 NOTE — NURSING NOTE
Oncology Rooming Note    January 19, 2018 11:20 AM   Jairo Prieto is a 42 year old female who presents for:    Chief Complaint   Patient presents with     Oncology Clinic Visit     Follow up-NHL     Initial Vitals: /74 (BP Location: Right arm, Patient Position: Sitting, Cuff Size: Adult Regular)  Pulse 90  Temp 98.3  F (36.8  C) (Oral)  Ht 1.829 m (6')  Wt 87.3 kg (192 lb 8 oz)  LMP 01/02/2018  SpO2 100%  BMI 26.11 kg/m2 Estimated body mass index is 26.11 kg/(m^2) as calculated from the following:    Height as of this encounter: 1.829 m (6').    Weight as of this encounter: 87.3 kg (192 lb 8 oz). Body surface area is 2.11 meters squared.  No Pain (0) Comment: Data Unavailable   Patient's last menstrual period was 01/02/2018.  Allergies reviewed: Yes  Medications reviewed: Yes    Medications: Medication refills not needed today.  Pharmacy [facility] entered into [id number] [place] [address]    Clinical concerns: Rash, Heart Palpitations, Muscles tight Dr. Brooks was notified.    10 minutes for nursing intake (face to face time)     Louise Souza LPN

## 2018-10-23 DIAGNOSIS — Z85.72 HISTORY OF LYMPHOMA: Primary | ICD-10-CM

## 2018-10-23 DIAGNOSIS — Z91.89 AT RISK FOR CARDIOMYOPATHY: ICD-10-CM

## 2018-10-23 DIAGNOSIS — Z92.3 S/P RADIATION THERAPY: ICD-10-CM

## 2018-10-23 DIAGNOSIS — Z92.21 STATUS POST CHEMOTHERAPY: ICD-10-CM

## 2018-11-02 ENCOUNTER — RADIANT APPOINTMENT (OUTPATIENT)
Dept: CARDIOLOGY | Facility: CLINIC | Age: 43
End: 2018-11-02
Attending: NURSE PRACTITIONER
Payer: COMMERCIAL

## 2018-11-02 DIAGNOSIS — Z92.3 S/P RADIATION THERAPY: ICD-10-CM

## 2018-11-02 DIAGNOSIS — Z91.89 AT RISK FOR CARDIOMYOPATHY: ICD-10-CM

## 2018-11-02 DIAGNOSIS — Z92.21 STATUS POST CHEMOTHERAPY: ICD-10-CM

## 2018-11-02 DIAGNOSIS — Z85.72 HISTORY OF LYMPHOMA: ICD-10-CM

## 2019-10-05 ENCOUNTER — HEALTH MAINTENANCE LETTER (OUTPATIENT)
Age: 44
End: 2019-10-05

## 2020-07-23 DIAGNOSIS — Z92.21 STATUS POST CHEMOTHERAPY: ICD-10-CM

## 2020-07-23 DIAGNOSIS — Z91.89 AT RISK FOR CARDIOMYOPATHY: ICD-10-CM

## 2020-07-23 DIAGNOSIS — Z85.72 HISTORY OF LYMPHOMA: Primary | ICD-10-CM

## 2020-07-23 DIAGNOSIS — Z92.3 S/P RADIATION THERAPY: ICD-10-CM

## 2020-09-18 ENCOUNTER — ANCILLARY PROCEDURE (OUTPATIENT)
Dept: CARDIOLOGY | Facility: CLINIC | Age: 45
End: 2020-09-18
Attending: NURSE PRACTITIONER
Payer: COMMERCIAL

## 2020-09-18 ENCOUNTER — ONCOLOGY VISIT (OUTPATIENT)
Dept: ONCOLOGY | Facility: CLINIC | Age: 45
End: 2020-09-18
Attending: INTERNAL MEDICINE
Payer: COMMERCIAL

## 2020-09-18 VITALS
RESPIRATION RATE: 18 BRPM | OXYGEN SATURATION: 99 % | BODY MASS INDEX: 26.28 KG/M2 | HEART RATE: 81 BPM | TEMPERATURE: 98.5 F | DIASTOLIC BLOOD PRESSURE: 78 MMHG | HEIGHT: 72 IN | WEIGHT: 194 LBS | SYSTOLIC BLOOD PRESSURE: 115 MMHG

## 2020-09-18 DIAGNOSIS — R19.5 CHANGE IN STOOL: ICD-10-CM

## 2020-09-18 DIAGNOSIS — Z23 NEED FOR PROPHYLACTIC VACCINATION AND INOCULATION AGAINST INFLUENZA: ICD-10-CM

## 2020-09-18 DIAGNOSIS — Z92.3 S/P RADIATION THERAPY: ICD-10-CM

## 2020-09-18 DIAGNOSIS — Z09 CHEMOTHERAPY FOLLOW-UP EXAMINATION: Primary | ICD-10-CM

## 2020-09-18 DIAGNOSIS — Z91.89 AT RISK FOR CARDIOMYOPATHY: ICD-10-CM

## 2020-09-18 DIAGNOSIS — Z92.21 STATUS POST CHEMOTHERAPY: ICD-10-CM

## 2020-09-18 DIAGNOSIS — L30.9 ECZEMA, UNSPECIFIED TYPE: ICD-10-CM

## 2020-09-18 DIAGNOSIS — C91.00: ICD-10-CM

## 2020-09-18 DIAGNOSIS — S62.102S CLOSED FRACTURE OF LEFT WRIST, SEQUELA: ICD-10-CM

## 2020-09-18 DIAGNOSIS — Z85.72 HISTORY OF LYMPHOMA: ICD-10-CM

## 2020-09-18 DIAGNOSIS — T66.XXXS ADVERSE EFFECT OF RADIATION, SEQUELA: ICD-10-CM

## 2020-09-18 PROCEDURE — G0008 ADMIN INFLUENZA VIRUS VAC: HCPCS

## 2020-09-18 PROCEDURE — 25000128 H RX IP 250 OP 636: Mod: ZF | Performed by: INTERNAL MEDICINE

## 2020-09-18 PROCEDURE — 90686 IIV4 VACC NO PRSV 0.5 ML IM: CPT | Mod: ZF | Performed by: INTERNAL MEDICINE

## 2020-09-18 PROCEDURE — G0463 HOSPITAL OUTPT CLINIC VISIT: HCPCS

## 2020-09-18 RX ORDER — TRIAMCINOLONE ACETONIDE 1 MG/G
CREAM TOPICAL
Qty: 30 G | Refills: 11 | Status: SHIPPED | OUTPATIENT
Start: 2020-09-18 | End: 2021-12-24

## 2020-09-18 RX ADMIN — INFLUENZA A VIRUS A/GUANGDONG-MAONAN/SWL1536/2019 CNIC-1909 (H1N1) ANTIGEN (FORMALDEHYDE INACTIVATED), INFLUENZA A VIRUS A/HONG KONG/2671/2019 (H3N2) ANTIGEN (FORMALDEHYDE INACTIVATED), INFLUENZA B VIRUS B/PHUKET/3073/2013 ANTIGEN (FORMALDEHYDE INACTIVATED), AND INFLUENZA B VIRUS B/WASHINGTON/02/2019 ANTIGEN (FORMALDEHYDE INACTIVATED) 0.5 ML: 15; 15; 15; 15 INJECTION, SUSPENSION INTRAMUSCULAR at 11:41

## 2020-09-18 ASSESSMENT — PAIN SCALES - GENERAL: PAINLEVEL: NO PAIN (0)

## 2020-09-18 ASSESSMENT — MIFFLIN-ST. JEOR: SCORE: 1662.11

## 2020-09-18 NOTE — NURSING NOTE
Flu vaccination given today in right deltoid. VIS given to pt. Patient tolerated well. See SHALONDA Chapman MA

## 2020-09-18 NOTE — PROGRESS NOTES
Dear Dr. Vogel,  I had the pleasure of seeing our mutual patient, Jairo Hendricks today, September 18, 2020, in the Long-term F/U Clinic for childhood cancer survivors here at the [facility]. See the detailed assessment and recommendations below. This summary will serve as a portable health summary for her related to her complex cancer care history.    Treatment Summary to date based on available records:  Cancer Diagnosis    Diagnosis:  T cell lymphoblastic lymphoma   Date of Diagnosis:  7/24/1985 Age at Diagnosis:  9 years Date Therapy Completed: 2/1987   Sites involved/stage/diagnostic details: mediastinum, CNS-, BM- Laterality:  right   Hereditary/congenital history:    Pertinent past medical history:       Treatment Center:   [facility] Medical Record #:  [id number]   MD/APN Contact Information:  Dr. Keven Lino   Relapse(s)  NONE   Subsequent Malignant Neoplasm - NONE   CANCER TREATMENT SUMMARY    Protocol       Acronym/Number Title/Description   Initiated Completed On-Study   [facility] 502 Newly diagnosed NHL 7/25/1985 2/1987 NO   Surgery    Date  Procedure Site (if applicable) Laterality (if applicable) Surgeon/Institution   7/24/1985 Fine needle aspirate Anterior mediastinum right [facility]   Chemotherapy      Drug Name Route Cumulative Dose   Cyclophosphamide  Prednisone  Vincristine  Daunorubicin  Cytarabine  Methotrexate  6 Thioguanine  L asparaginase  BCNU  Hydroxyurea IV  PO  IV  IV  IV/IT  IT/PO  PO  IM  IV  PO 6 GM/m2        360 mg/m2  5800 mg/m2 (IV)/77 mg/m2 (IT)  182 mg/m2 (IT)        270 mg/m27     **all doses are estimated**   Bioimmunotherapy - NONE   Radiation  YES   Site/Field Laterality Start Stop   Fractions Dose per Fraction (cGy) Total Dose (cGy) Type     Mediastinum Anterior/posterior 11/1/1985 11/15/1985 10 150 cGy 1500 cGy       Radiation oncologist:  Keven Verdin   Institution:  [facility]                 Transplant  None   Complications/Late Effects      Problem Date onset Date resolved Status   Thyroid Nodules- now s/p total thyroidectomy 5/18/2012   Levothyroxine replacement   Adverse Drug Reactions/Allergies  - NONE    Additional Information/Comments    Labs/studies to consider:  TSH yearly, needs ECHO every 2 years, mammography and breast MRI yearly, consider   PFTs 6/25/2015 WNL; repeat PRN  DTaP in 2023, Shingrix at 52  Hep and HIV serologies negative in 2012    Anthracycline cumulative dose re-calculated based on new data (Shilpa CISNEROS, Cece WM, Julia MARTINEZ, et al. Derivation of Anthracycline and Anthraquinone Equivalence Ratios to Doxorubicin for Late-Onset Cardiotoxicity. CELESTE Oncol. 2019;5(6):864-871. Doi:10.1001/jamaoncol.2018.6634): now use 0.5 correction factor for equivalence so 180 mg/m2       Summary prepared by:  Jessica Murray Date prepared:  5/4/2012   Summary updated by: Sam Brooks Date updated: 9/18/2020        Interval History  Ms. Jerardo Prieto is doing well and was last seen in [facility] clinic 2.5 years ago. She's been well but ROS as below remarkable for palpitations intermittently (monthly). No chest pain, dyspnea, decreased exercise tolerance. Sedentary work but walks the dog regularly. Has seen Dermatology and had a biopsy showing spongioform dermatitis- eczematous- gets flares now and then- uses creams for this. Now 44, has not had breast MRI screening despite high-risk status.     PSH  Total thyroidectomy     Current Outpatient Medications   Medication     calcium-vitamin D 500-125 MG-UNIT TABS     cetirizine (ZYRTEC ALLERGY) 10 MG tablet     ibuprofen (ADVIL,MOTRIN) 200 MG tablet     levothyroxine (SYNTHROID, LEVOTHROID) 150 MCG tablet     Multiple Vitamin (MULTI-VITAMIN PO)     OMEGA-3 FATTY ACIDS PO     triamcinolone (KENALOG) 0.1 % cream     azelastine (ASTELIN) 0.1 % spray     Cholecalciferol 18989 UNIT TABS     No current facility-administered medications for this  "visit.           Review Of Systems  Skin: rash improved but flares intermittently (triamcinolone cream effective- renewed)  Eyes: negative  Ears/Nose/Throat: sinus allergy symptoms- on flonase, claritin  Respiratory: No shortness of breath, dyspnea on exertion, cough, or hemoptysis  Cardiovascular: palpitations monthly, no change in exertional tolerance, no angina  Gastrointestinal: change in stools- caliber, consistency, odor. Float.  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 Work station not ergonomically optimized (question whether her upper body/neck myofascial symptoms relate to this)  Feels safe at home.  No tobacco or drugs.  Excess alcohol use- no FH of alcoholism. Discussed high risk markers (>14 drinks/wk or >4 drinks/d)  1 cat, 1 dog.  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     Examination  /78   Pulse 81   Temp 98.5  F (36.9  C) (Tympanic)   Resp 18   Ht 1.845 m (6' 0.64\")   Wt 88 kg (194 lb)   SpO2 99%   BMI 25.85 kg/m      EXAM:  Constitutional: healthy, alert and no distress  Head: Normocephalic. No masses, lesions, tenderness or abnormalities  Neck: Neck supple. No adenopathy. Thyroid non-palpable, scar present. Carotids without bruits.  ENT: ENT exam normal, no neck nodes or sinus tenderness  Cardiovascular: negative, PMI normal. No lifts, heaves, or thrills. RRR. No murmurs, clicks gallops or rub  Respiratory: negative, Percussion normal. Good diaphragmatic excursion. Lungs clear  Gastrointestinal: Abdomen soft, non-tender. BS normal. No masses, " organomegaly  : Deferred  Musculoskeletal: extremities normal- no gross deformities noted, gait normal and normal muscle tone  Skin: no rashes or worrisome moles.   Neurologic: normal mentation, gait, coordination  Psychiatric: affect normal/bright  Hematologic/Lymphatic/Immunologic: Normal cervical/inguinal lymph nodes     Assessment and Plan:  Ms. Kutrz is a healthy 43 yo seen for longterm follow-up of her treatment for Non-Hodgkins Lymphoma.  She is doing well but will need surveillance studies and on-going screening based on the treatment she received.     1)  Non-Hodgkin Lymphoma.  She is now over 32 years s/p completion of therapy so the risk of recurrence is exceedingly low.  No family history to suggest a family cancer syndrome but the early breast cancer in her paternal grandmother increases her already elevated risk.     2)  Chemotherapy late effects.  The cyclophosphamide can effect the body in many ways, but this far out from treatment the reproductive system and the urinary tract are the main systems we worry about.  Given the normal onset of menstruation and normal pubertal development, ovarian function was preserved but fertility was likely affected.  As for the urinary tract, microhematuria is common and should be checked annually with urinalysis.  Kidney dysfunction is more rare and we'd recommend annual BP screening and creatinine measurement for any hypertension.  The anthracycline exposure (daunomycin) does predispose to late cardiac effects.  The cumulative dose has been recalculated based on a new study (Shilpa CISNEROS, Cece JORDAN, Julia KL, et al. Derivation of Anthracycline and Anthraquinone Equivalence Ratios to Doxorubicin for Late-Onset Cardiotoxicity. CELESTE Oncol. 2019;5(6):864-871. Doi:10.1001/jamaoncol.2018.6634): now use 0.5 correction factor for equivalence so 180 mg/m2. This changes recs to biannual echo (done this morning and NORMAL). In addition, the reported palpitations  merit Zio Patch monitoring in this setting. Finally, the intra-thecal methotrexate can cause changes in cognition and learning disabilities.  Ms. Kurtz has clearly done well in school and is doing well at work - she denies any PTSD, anxiety or depression symptoms. If any changes occur, we'd recommend formal Neuropsych testing. Lastly, the prednisone and MTX can affect bone development and particularly in light of her h/o wrist fracture, would recommend a baseline DEXA scan now (ordered). 2000 international unit(s) Vitamin D and 1200 mg calcium daily recommended.     3)  Radiation exposure.  Ms. Kurtz received mantle irradiation which exposes the chest, breasts, heart and lungs to radiation.  Thus, there is an increased risk of problems with these organs including second cancers and fibrosis.  At present, there is no indication of any problems.  We recommend aggressive breast cancer screening given the fact that 25% of young women receiving this radiation exposure may develop a breast cancer by age 45. In addition, her family history increases this risk further. Thus annual breast MRI is indicated and we recommend annual mammography as well (6 mos apart from the MRI). MRI ordered today.  Ms. Kurtz has fibrocystic changes in both breasts so SBE is less useful.  We also recommend avoiding sun exposure to the radiated area and close follow up of any moles in these areas.  We ordered pulmonary function tests on entry into [facility] in 2012 and these were normal so don't need further testing unless symptoms develop.  Also, we discussed the increased risk of coronary artery disease after mantle radiation and the importance of other risk factor reduction (cholesterol and blood pressure) and daily exercise. This is a real and significant risk- any change in exercise tolerance or onset of exertional symptoms should prompt exercise-imaging evaluation for coronary disease.     4)  Other related follow-up issues.   Dental late effects are common and she's had some receding gums but o/w good dental health.  Eye issues are less common but we'd recommend a baseline eye exam and glaucoma screening. She received blood transfusions during her treatment and is Hepatitis C and HIV negative (tested in 2012)     5) GI symptoms.  Her change in stools, GI symptoms and h/o radiation therapy with probable scatter to abdomen merit colonoscopy evaluation (ordered). In addition, have added a fecal elastase test as her description of stool changes, greasy consistency, floating, and foul odor suggest fat malabsorption.    6) Healthcare Maintenance. Ms. Jerardo Prieto has established primary care which is critical. See above for breast cancer screening recs - she is in the highest risk category and merits annual breast MRI. Max cardiac RF reduction strategy. Regular exercise, healthy diet, moderation in EtOH intake all discussed.      It was a pleasure to meet Ms. Kurtz today and to be involved in her care.  Do not hesitate to contact me with any questions regarding this evaluation and my recommendations.     Sincerely,      Home Brooks MD, FACP, FAAP    Over 25 min of 40 min visit spent in care coordination and counseling related to the above issues.      Home Brooks MD, FACP, FAAP

## 2020-09-18 NOTE — NURSING NOTE
"Oncology Rooming Note    September 18, 2020 10:30 AM   Jairo Prieto is a 44 year old female who presents for:    Chief Complaint   Patient presents with     Oncology Clinic Visit     lymphoma      Initial Vitals: /78   Pulse 81   Temp 98.5  F (36.9  C) (Tympanic)   Resp 18   Ht 1.845 m (6' 0.64\")   Wt 88 kg (194 lb)   SpO2 99%   BMI 25.85 kg/m   Estimated body mass index is 25.85 kg/m  as calculated from the following:    Height as of this encounter: 1.845 m (6' 0.64\").    Weight as of this encounter: 88 kg (194 lb). Body surface area is 2.12 meters squared.  No Pain (0) Comment: Data Unavailable   No LMP recorded.  Allergies reviewed: Yes  Medications reviewed: Yes    Medications: Medication refills not needed today.  Pharmacy name entered into [id number] [place] [address]    Clinical concerns: No new concerns        Fozia Baxter MA            "

## 2020-10-30 ENCOUNTER — TELEPHONE (OUTPATIENT)
Dept: GASTROENTEROLOGY | Facility: CLINIC | Age: 45
End: 2020-10-30

## 2020-10-30 NOTE — TELEPHONE ENCOUNTER
1st attempt to schedule the following procedure:    Procedure: Lower Endoscopy    Lower Endoscopy Type: Colonoscopy    Purpose of Colonoscopy Procedure: Screening    Colonoscopy Sedation: Conscious/Moderate    Preferred Location: [facility]      LMTCB x1

## 2020-11-14 ENCOUNTER — HEALTH MAINTENANCE LETTER (OUTPATIENT)
Age: 45
End: 2020-11-14

## 2020-11-14 DIAGNOSIS — Z11.59 ENCOUNTER FOR SCREENING FOR OTHER VIRAL DISEASES: Primary | ICD-10-CM

## 2020-12-17 ENCOUNTER — HOSPITAL ENCOUNTER (OUTPATIENT)
Facility: AMBULATORY SURGERY CENTER | Age: 45
Discharge: HOME | End: 2020-12-17
Attending: INTERNAL MEDICINE | Admitting: INTERNAL MEDICINE
Payer: COMMERCIAL

## 2020-12-17 VITALS
HEART RATE: 85 BPM | SYSTOLIC BLOOD PRESSURE: 112 MMHG | BODY MASS INDEX: 25.73 KG/M2 | RESPIRATION RATE: 16 BRPM | OXYGEN SATURATION: 100 % | TEMPERATURE: 98 F | HEIGHT: 72 IN | DIASTOLIC BLOOD PRESSURE: 73 MMHG | WEIGHT: 190 LBS

## 2020-12-17 DIAGNOSIS — K62.89 NODULE OF ANUS: Primary | ICD-10-CM

## 2020-12-17 LAB
COLONOSCOPY: NORMAL
HCG UR QL: NEGATIVE
INTERNAL QC OK POCT: YES

## 2020-12-17 PROCEDURE — 45380 COLONOSCOPY AND BIOPSY: CPT

## 2020-12-17 PROCEDURE — 88305 TISSUE EXAM BY PATHOLOGIST: CPT | Performed by: PATHOLOGY

## 2020-12-17 RX ORDER — SIMETHICONE
LIQUID (ML) MISCELLANEOUS PRN
Status: DISCONTINUED | OUTPATIENT
Start: 2020-12-17 | End: 2020-12-17 | Stop reason: HOSPADM

## 2020-12-17 RX ORDER — PROCHLORPERAZINE MALEATE 10 MG
10 TABLET ORAL EVERY 6 HOURS PRN
Status: DISCONTINUED | OUTPATIENT
Start: 2020-12-17 | End: 2020-12-18 | Stop reason: HOSPADM

## 2020-12-17 RX ORDER — NALOXONE HYDROCHLORIDE 0.4 MG/ML
0.4 INJECTION, SOLUTION INTRAMUSCULAR; INTRAVENOUS; SUBCUTANEOUS
Status: DISCONTINUED | OUTPATIENT
Start: 2020-12-17 | End: 2020-12-18 | Stop reason: HOSPADM

## 2020-12-17 RX ORDER — NALOXONE HYDROCHLORIDE 0.4 MG/ML
0.2 INJECTION, SOLUTION INTRAMUSCULAR; INTRAVENOUS; SUBCUTANEOUS
Status: DISCONTINUED | OUTPATIENT
Start: 2020-12-17 | End: 2020-12-18 | Stop reason: HOSPADM

## 2020-12-17 RX ORDER — ONDANSETRON 2 MG/ML
4 INJECTION INTRAMUSCULAR; INTRAVENOUS
Status: COMPLETED | OUTPATIENT
Start: 2020-12-17 | End: 2020-12-17

## 2020-12-17 RX ORDER — FLUMAZENIL 0.1 MG/ML
0.2 INJECTION, SOLUTION INTRAVENOUS
Status: ACTIVE | OUTPATIENT
Start: 2020-12-17 | End: 2020-12-17

## 2020-12-17 RX ORDER — LIDOCAINE 40 MG/G
CREAM TOPICAL
Status: DISCONTINUED | OUTPATIENT
Start: 2020-12-17 | End: 2020-12-17 | Stop reason: HOSPADM

## 2020-12-17 RX ORDER — ONDANSETRON 4 MG/1
4 TABLET, ORALLY DISINTEGRATING ORAL EVERY 6 HOURS PRN
Status: DISCONTINUED | OUTPATIENT
Start: 2020-12-17 | End: 2020-12-18 | Stop reason: HOSPADM

## 2020-12-17 RX ORDER — ONDANSETRON 2 MG/ML
4 INJECTION INTRAMUSCULAR; INTRAVENOUS EVERY 6 HOURS PRN
Status: DISCONTINUED | OUTPATIENT
Start: 2020-12-17 | End: 2020-12-18 | Stop reason: HOSPADM

## 2020-12-17 RX ORDER — FENTANYL CITRATE 50 UG/ML
INJECTION, SOLUTION INTRAMUSCULAR; INTRAVENOUS PRN
Status: DISCONTINUED | OUTPATIENT
Start: 2020-12-17 | End: 2020-12-17 | Stop reason: HOSPADM

## 2020-12-17 RX ADMIN — ONDANSETRON 4 MG: 2 INJECTION INTRAMUSCULAR; INTRAVENOUS at 07:56

## 2020-12-17 ASSESSMENT — MIFFLIN-ST. JEOR: SCORE: 1628.83

## 2020-12-18 LAB — COPATH REPORT: NORMAL

## 2020-12-30 NOTE — TELEPHONE ENCOUNTER
RECORDS RECEIVED FROM: Internal    DATE RECEIVED: 2/1/2021   NOTES STATUS DETAILS   OFFICE NOTE from referring provider  Internal Internal recs in epic    LABS     BIOPSIES/PATHOLOGY RELATED TO DIAGNOSIS Internal 12/17/20   DIAGNOSTIC PROCEDURES     COLONOSCOPY Internal 12/17/20

## 2021-02-01 ENCOUNTER — OFFICE VISIT (OUTPATIENT)
Dept: SURGERY | Facility: CLINIC | Age: 46
End: 2021-02-01
Attending: INTERNAL MEDICINE
Payer: COMMERCIAL

## 2021-02-01 ENCOUNTER — PRE VISIT (OUTPATIENT)
Dept: SURGERY | Facility: CLINIC | Age: 46
End: 2021-02-01

## 2021-02-01 VITALS
HEIGHT: 72 IN | HEART RATE: 86 BPM | DIASTOLIC BLOOD PRESSURE: 89 MMHG | OXYGEN SATURATION: 99 % | WEIGHT: 195.4 LBS | BODY MASS INDEX: 26.47 KG/M2 | SYSTOLIC BLOOD PRESSURE: 128 MMHG

## 2021-02-01 DIAGNOSIS — R14.3 EXCESSIVE FLATUS: ICD-10-CM

## 2021-02-01 DIAGNOSIS — R10.84 ABDOMINAL PAIN, GENERALIZED: ICD-10-CM

## 2021-02-01 DIAGNOSIS — R14.0 BLOATING: ICD-10-CM

## 2021-02-01 DIAGNOSIS — K62.89 HYPERTROPHIED ANAL PAPILLA: Primary | ICD-10-CM

## 2021-02-01 DIAGNOSIS — R12 HEARTBURN: ICD-10-CM

## 2021-02-01 DIAGNOSIS — R19.7 DIARRHEA, UNSPECIFIED TYPE: ICD-10-CM

## 2021-02-01 PROCEDURE — 99202 OFFICE O/P NEW SF 15 MIN: CPT | Performed by: NURSE PRACTITIONER

## 2021-02-01 ASSESSMENT — ENCOUNTER SYMPTOMS
ABDOMINAL PAIN: 0
HEARTBURN: 1
BOWEL INCONTINENCE: 0
RECTAL PAIN: 1
VOMITING: 0
NAUSEA: 0
BLOOD IN STOOL: 0
DIARRHEA: 1
JAUNDICE: 0
CONSTIPATION: 0
BLOATING: 1

## 2021-02-01 ASSESSMENT — MIFFLIN-ST. JEOR: SCORE: 1653.33

## 2021-02-01 ASSESSMENT — PAIN SCALES - GENERAL: PAINLEVEL: NO PAIN (0)

## 2021-02-01 NOTE — PROGRESS NOTES
Colon and Rectal Surgery Consult Clinic Note    Date: 2021     Referring provider:  Cristian Villegas, DO  [address] [place] [address]     RE: Jairo Prieto  : 1975  COLE: 2021    Jairo Prieto is a very pleasant 45 year old female who presents today for evaluation of anal nodule found on colonoscopy.    HPI:.  Has a history of non-Hodgkin's lymphoma in  and she follows with a survivorship research program here.  She has been having some reflux, bloating, diarrhea, increased flatus, and oily stools.  She underwent an EGD and esophagram as well as a colonoscopy with Dr. Villegas. On colonoscopy there was a palpable hard nodule in the left postero lateral internal anal wall. She reports that this leaves a divot in her stools but is generally not painful. She does have occasional sharp pain with bowel movements along with some bright red blood. No constipation or hard stools.    Physical Examination:  /89 (BP Location: Left arm, Patient Position: Sitting, Cuff Size: Adult Regular)   Pulse 86   Ht 6'   Wt 195 lb 6.4 oz   SpO2 99%   BMI 26.50 kg/m    General: alert, oriented, in no acute distress, sitting comfortably  HEENT: mucous membranes moist    Perianal external examination: Exam was chaperoned by Rosalia Linton MA  Perianal skin: Intact with no excoriation or lichenification.  Lesions: No evidence of an external lesion, nodularity, or induration in the perianal region.  Eversion of buttocks: There was not evidence of an anal fissure. Details: scarring in the posterior midline.  Skin tags or external hemorrhoids: None.  Digital rectal examination: Was performed.   Sphincter tone: Good.  Palpable lesions: Yes - Location: small, movable lesion palpable in the posterior midline just inside the anal verge.  Other: None.  Bimanual examination: was not performed    Anoscopy: Was performed.   Hemorrhoids: No significant internal hemorrhoids.  Lesions:  Yes: hypertrophied anal papilla in the posterior midline    Assessment/Plan: 45 year old female with hypertrophied anal papilla. She has some scarring in the posterior midline which could be from a healed fissure. If pain or bleeding return, we could try treating with topical diltiazem. No further intervention needed otherwise. Will refer her to GI for her other GI complaints. Patient's questions were answered to her stated satisfaction and she is in agreement with this plan.    Medical history:  Past Medical History:   Diagnosis Date     Eczema of external ear      History of thyroidectomy, total 11/24/1992    Performed due to thyroid nodules s/p XRT. Parathyroid autotransplant done.     Non Hodgkin's lymphoma (H) Diagnosed 1985    Cumulative Anthracycline 270 mg/m2, Cyclophosphamide 4.2 gm/M2, Mantle irradiation 1500 cgy     Wrist fracture, left 2007    Normal DEXA after       Surgical history:  Past Surgical History:   Procedure Laterality Date     CENTRAL VENOUS CATHETER      Removed after therapy completed     COLONOSCOPY N/A 12/17/2020    Procedure: COLONOSCOPY, WITH POLYPECTOMY AND BIOPSY;  Surgeon: Cristian Villegas DO;  Location: [facility] OR     THYROIDECTOMY  1992       Problem list:  Patient Active Problem List    Diagnosis Date Noted     Radiation exposure 05/18/2012     Priority: Medium     Treatment of Non-Hodgkins Lymphoma       Chemotherapy follow-up examination 05/18/2012     Priority: Medium     270 mg/m2 anthracycline  4.2 gm/m2 cyclophosphamide         Medications:  Current Outpatient Medications   Medication Sig Dispense Refill     azelastine (ASTELIN) 0.1 % spray 1 spray       calcium-vitamin D 500-125 MG-UNIT TABS        cetirizine (ZYRTEC ALLERGY) 10 MG tablet Take 10 mg by mouth daily as needed.       Cholecalciferol 76102 UNIT TABS Take  by mouth. 3 wk followed by 1 week, 16 weeks total       ibuprofen (ADVIL,MOTRIN) 200 MG tablet Take 200 mg by mouth every 4 hours as needed. Approx. 2 tabs per  day       levothyroxine (SYNTHROID, LEVOTHROID) 150 MCG tablet Take 150 mcg by mouth daily.       Multiple Vitamin (MULTI-VITAMIN PO) Take  by mouth. 2 tabs per day       OMEGA-3 FATTY ACIDS PO Take 2 g by mouth       triamcinolone (KENALOG) 0.1 % external cream Apply to affected areas bid as needed itching 30 g 11       Allergies:  Allergies   Allergen Reactions     Seasonal Allergies Itching     Sinus Infections       Family history:  Family History   Problem Relation Age of Onset     Breast Cancer Paternal Grandmother 50     Neurologic Disorder Sister         Multiple Sclerosis       Social history:  Social History     Tobacco Use     Smoking status: Former Smoker     Types: Cigarettes     Quit date: 2006     Years since quittin.7     Smokeless tobacco: Never Used     Tobacco comment: smoked on and off  until    Substance Use Topics     Alcohol use: Yes     Alcohol/week: 4.2 standard drinks     Types: 5 Standard drinks or equivalent per week    Marital status: .  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    Nursing Notes:   Rosalia Linton  2021 11:02 AM  Signed  Chief Complaint   Patient presents with     New Patient     Nodule of anus        Vitals:    21 1059   BP: 128/89   BP Location: Left arm   Patient Position: Sitting   Cuff Size: Adult Regular   Pulse: 86   SpO2: 99%   Weight: 195 lb 6.4 oz   Height: 6'       Body mass index is 26.5 kg/m .    Rosalia Linton MA         15 minutes spent on the date of the encounter doing chart review, history and exam, documentation and further activities as noted above.     GERARD Henley, NP-C  Colon and Rectal Surgery   [facility]    This note was created using speech recognition software and may contain unintended word substitutions.

## 2021-02-01 NOTE — LETTER
2021       RE: Jairo Prieto  [address] [place] [address]     Dear Colleague,    Thank you for referring your patient, Jairo Prieto, to the [facility] COL[facility]AL [facility]GERY [facility]INIC [employer] [facility]. Please see a copy of my visit note below.    Colon and Rectal Surgery Consult Clinic Note    Date: 2021     Referring provider:  Cristian Villegas DO  [address] [place] [address]     RE: Jairo Prieto  : 1975  COLE: 2021    Jairo Prieto is a very pleasant 45 year old female who presents today for evaluation of anal nodule found on colonoscopy.    HPI:.  Has a history of non-Hodgkin's lymphoma in  and she follows with a survivorship research program here.  She has been having some reflux, bloating, diarrhea, increased flatus, and oily stools.  She underwent an EGD and esophagram as well as a colonoscopy with Dr. Villegas. On colonoscopy there was a palpable hard nodule in the left postero lateral internal anal wall. She reports that this leaves a divot in her stools but is generally not painful. She does have occasional sharp pain with bowel movements along with some bright red blood. No constipation or hard stools.    Physical Examination:  /89 (BP Location: Left arm, Patient Position: Sitting, Cuff Size: Adult Regular)   Pulse 86   Ht 6'   Wt 195 lb 6.4 oz   SpO2 99%   BMI 26.50 kg/m    General: alert, oriented, in no acute distress, sitting comfortably  HEENT: mucous membranes moist    Perianal external examination: Exam was chaperoned by Rosalia Linton MA  Perianal skin: Intact with no excoriation or lichenification.  Lesions: No evidence of an external lesion, nodularity, or induration in the perianal region.  Eversion of buttocks: There was not evidence of an anal fissure. Details: scarring in the posterior midline.  Skin tags or external  hemorrhoids: None.  Digital rectal examination: Was performed.   Sphincter tone: Good.  Palpable lesions: Yes - Location: small, movable lesion palpable in the posterior midline just inside the anal verge.  Other: None.  Bimanual examination: was not performed    Anoscopy: Was performed.   Hemorrhoids: No significant internal hemorrhoids.  Lesions: Yes: hypertrophied anal papilla in the posterior midline    Assessment/Plan: 45 year old female with hypertrophied anal papilla. She has some scarring in the posterior midline which could be from a healed fissure. If pain or bleeding return, we could try treating with topical diltiazem. No further intervention needed otherwise. Will refer her to GI for her other GI complaints. Patient's questions were answered to her stated satisfaction and she is in agreement with this plan.    Medical history:  Past Medical History:   Diagnosis Date     Eczema of external ear      History of thyroidectomy, total 11/24/1992    Performed due to thyroid nodules s/p XRT. Parathyroid autotransplant done.     Non Hodgkin's lymphoma (H) Diagnosed 1985    Cumulative Anthracycline 270 mg/m2, Cyclophosphamide 4.2 gm/M2, Mantle irradiation 1500 cgy     Wrist fracture, left 2007    Normal DEXA after       Surgical history:  Past Surgical History:   Procedure Laterality Date     CENTRAL VENOUS CATHETER      Removed after therapy completed     COLONOSCOPY N/A 12/17/2020    Procedure: COLONOSCOPY, WITH POLYPECTOMY AND BIOPSY;  Surgeon: Crisitan Villegas DO;  Location: [facility] OR     THYROIDECTOMY  1992       Problem list:  Patient Active Problem List    Diagnosis Date Noted     Radiation exposure 05/18/2012     Priority: Medium     Treatment of Non-Hodgkins Lymphoma       Chemotherapy follow-up examination 05/18/2012     Priority: Medium     270 mg/m2 anthracycline  4.2 gm/m2 cyclophosphamide         Medications:  Current Outpatient Medications   Medication Sig Dispense Refill     azelastine (ASTELIN) 0.1 %  spray 1 spray       calcium-vitamin D 500-125 MG-UNIT TABS        cetirizine (ZYRTEC ALLERGY) 10 MG tablet Take 10 mg by mouth daily as needed.       Cholecalciferol 64026 UNIT TABS Take  by mouth. 3 wk followed by 1 week, 16 weeks total       ibuprofen (ADVIL,MOTRIN) 200 MG tablet Take 200 mg by mouth every 4 hours as needed. Approx. 2 tabs per day       levothyroxine (SYNTHROID, LEVOTHROID) 150 MCG tablet Take 150 mcg by mouth daily.       Multiple Vitamin (MULTI-VITAMIN PO) Take  by mouth. 2 tabs per day       OMEGA-3 FATTY ACIDS PO Take 2 g by mouth       triamcinolone (KENALOG) 0.1 % external cream Apply to affected areas bid as needed itching 30 g 11       Allergies:  Allergies   Allergen Reactions     Seasonal Allergies Itching     Sinus Infections       Family history:  Family History   Problem Relation Age of Onset     Breast Cancer Paternal Grandmother 50     Neurologic Disorder Sister         Multiple Sclerosis       Social history:  Social History     Tobacco Use     Smoking status: Former Smoker     Types: Cigarettes     Quit date: 2006     Years since quittin.7     Smokeless tobacco: Never Used     Tobacco comment: smoked on and off  until    Substance Use Topics     Alcohol use: Yes     Alcohol/week: 4.2 standard drinks     Types: 5 Standard drinks or equivalent per week    Marital status: .  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    Nursing Notes:   Rosalia Linton  2021 11:02 AM  Signed  Chief Complaint   Patient presents with     New Patient     Nodule of anus        Vitals:    21 1059   BP: 128/89   BP Location: Left arm   Patient Position: Sitting   Cuff Size: Adult Regular   Pulse: 86   SpO2: 99%   Weight: 195 lb 6.4 oz   Height: 6'       Body mass index is 26.5 kg/m .    Rosalia Linton MA      15 minutes spent on the date of the encounter doing chart review, history and exam, documentation and further activities as noted above.     Yolande sosa  GERARD Reddy, NP-C  Colon and Rectal Surgery   [facility]    This note was created using speech recognition software and may contain unintended word substitutions.

## 2021-02-01 NOTE — NURSING NOTE
Chief Complaint   Patient presents with     New Patient     Nodule of anus        Vitals:    02/01/21 1059   BP: 128/89   BP Location: Left arm   Patient Position: Sitting   Cuff Size: Adult Regular   Pulse: 86   SpO2: 99%   Weight: 195 lb 6.4 oz   Height: 6'       Body mass index is 26.5 kg/m .    Rosalia Linton MA

## 2021-04-05 ENCOUNTER — ANCILLARY PROCEDURE (OUTPATIENT)
Dept: MRI IMAGING | Facility: CLINIC | Age: 46
End: 2021-04-05
Attending: INTERNAL MEDICINE
Payer: COMMERCIAL

## 2021-04-05 ENCOUNTER — ANCILLARY PROCEDURE (OUTPATIENT)
Dept: BONE DENSITY | Facility: CLINIC | Age: 46
End: 2021-04-05
Attending: INTERNAL MEDICINE
Payer: COMMERCIAL

## 2021-04-05 DIAGNOSIS — T66.XXXS ADVERSE EFFECT OF RADIATION, SEQUELA: ICD-10-CM

## 2021-04-05 DIAGNOSIS — S62.102S CLOSED FRACTURE OF LEFT WRIST, SEQUELA: ICD-10-CM

## 2021-04-05 DIAGNOSIS — C91.00: ICD-10-CM

## 2021-04-05 DIAGNOSIS — Z09 CHEMOTHERAPY FOLLOW-UP EXAMINATION: ICD-10-CM

## 2021-04-05 PROCEDURE — 77080 DXA BONE DENSITY AXIAL: CPT | Performed by: INTERNAL MEDICINE

## 2021-04-05 PROCEDURE — A9585 GADOBUTROL INJECTION: HCPCS | Performed by: RADIOLOGY

## 2021-04-05 PROCEDURE — 77049 MRI BREAST C-+ W/CAD BI: CPT | Performed by: RADIOLOGY

## 2021-04-05 RX ORDER — GADOBUTROL 604.72 MG/ML
10 INJECTION INTRAVENOUS ONCE
Status: COMPLETED | OUTPATIENT
Start: 2021-04-05 | End: 2021-04-05

## 2021-04-05 RX ADMIN — GADOBUTROL 9 ML: 604.72 INJECTION INTRAVENOUS at 10:43

## 2021-04-05 NOTE — DISCHARGE INSTRUCTIONS
MRI Contrast Discharge Instructions    The IV contrast you received today will pass out of your body in your  urine. This will happen in the next 24 hours. You will not feel this process.  Your urine will not change color.    Drink at least 4 extra glasses of water or juice today (unless your doctor  has restricted your fluids). This reduces the stress on your kidneys.  You may take your regular medicines.    If you are on dialysis: It is best to have dialysis today.    If you have a reaction: Most reactions happen right away. If you have  any new symptoms after leaving the hospital (such as hives or swelling),  call your hospital at the correct number below. Or call your family doctor.  If you have breathing distress or wheezing, call [phone].    Special instructions: ***    I have read and understand the above information.    Signature:______________________________________ Date:___________    Staff:__________________________________________ Date:___________     Time:__________    [place] [facility] Departments:    ___[place]: [phone]  ___[facility]: [phone]  ___[place]: [phone] ___[facility]: [phone]  ___[facility]: [phone]  ___[facility]: [phone]  ___[phone]  ___[facility]: [phone]  ___[phone]

## 2021-04-06 DIAGNOSIS — Z11.59 ENCOUNTER FOR SCREENING FOR OTHER VIRAL DISEASES: ICD-10-CM

## 2021-04-15 ENCOUNTER — ANCILLARY PROCEDURE (OUTPATIENT)
Dept: MAMMOGRAPHY | Facility: CLINIC | Age: 46
End: 2021-04-15
Attending: INTERNAL MEDICINE
Payer: COMMERCIAL

## 2021-04-15 ENCOUNTER — ANCILLARY PROCEDURE (OUTPATIENT)
Dept: MRI IMAGING | Facility: CLINIC | Age: 46
End: 2021-04-15
Attending: INTERNAL MEDICINE
Payer: COMMERCIAL

## 2021-04-15 DIAGNOSIS — R92.8 ABNORMAL FINDING ON BREAST IMAGING: Primary | ICD-10-CM

## 2021-04-15 DIAGNOSIS — R92.8 ABNORMAL FINDING ON BREAST IMAGING: ICD-10-CM

## 2021-04-15 PROCEDURE — A9585 GADOBUTROL INJECTION: HCPCS | Mod: RT | Performed by: RADIOLOGY

## 2021-04-15 PROCEDURE — 88305 TISSUE EXAM BY PATHOLOGIST: CPT | Performed by: PATHOLOGY

## 2021-04-15 PROCEDURE — 19085 BX BREAST 1ST LESION MR IMAG: CPT | Mod: RT | Performed by: RADIOLOGY

## 2021-04-15 PROCEDURE — 77065 DX MAMMO INCL CAD UNI: CPT | Mod: RT | Performed by: RADIOLOGY

## 2021-04-15 RX ORDER — LIDOCAINE HYDROCHLORIDE 10 MG/ML
10 INJECTION, SOLUTION EPIDURAL; INFILTRATION; INTRACAUDAL; PERINEURAL ONCE
Status: COMPLETED | OUTPATIENT
Start: 2021-04-15 | End: 2021-04-15

## 2021-04-15 RX ORDER — LIDOCAINE HYDROCHLORIDE AND EPINEPHRINE 10; 10 MG/ML; UG/ML
10 INJECTION, SOLUTION INFILTRATION; PERINEURAL ONCE
Status: COMPLETED | OUTPATIENT
Start: 2021-04-15 | End: 2021-04-15

## 2021-04-15 RX ORDER — GADOBUTROL 604.72 MG/ML
10 INJECTION INTRAVENOUS ONCE
Status: COMPLETED | OUTPATIENT
Start: 2021-04-15 | End: 2021-04-15

## 2021-04-15 RX ADMIN — LIDOCAINE HYDROCHLORIDE 10 ML: 10 INJECTION, SOLUTION EPIDURAL; INFILTRATION; INTRACAUDAL; PERINEURAL at 08:14

## 2021-04-15 RX ADMIN — GADOBUTROL 9 ML: 604.72 INJECTION INTRAVENOUS at 08:23

## 2021-04-15 RX ADMIN — LIDOCAINE HYDROCHLORIDE AND EPINEPHRINE 10 ML: 10; 10 INJECTION, SOLUTION INFILTRATION; PERINEURAL at 08:11

## 2021-04-16 LAB — COPATH REPORT: NORMAL

## 2021-04-19 ENCOUNTER — TELEPHONE (OUTPATIENT)
Dept: MAMMOGRAPHY | Facility: CLINIC | Age: 46
End: 2021-04-19

## 2021-04-19 NOTE — TELEPHONE ENCOUNTER
Spoke to Jairo about the benign findings from her breast biopsy done last week.  We discussed the Radiologist's recommendation of a Breast MRI in 6 months.  An order has been submitted to her provider to sign.  She stated she will call when ready to schedule the follow up imaging.  Jairo verbalized understanding of the plan and all questions and concerns were answered at this time.

## 2021-09-12 ENCOUNTER — HEALTH MAINTENANCE LETTER (OUTPATIENT)
Age: 46
End: 2021-09-12

## 2021-12-21 DIAGNOSIS — L30.9 ECZEMA, UNSPECIFIED TYPE: ICD-10-CM

## 2021-12-24 NOTE — TELEPHONE ENCOUNTER
TRIAMCINOLONE 0.1% CREAM      Last Written Prescription Date:  9/18/20  Last Fill Quantity: 30 g,   # refills: 11  Last Office Visit : 9/18/2020  [facility]  Future Office visit:  None scheduled    Routing refill request to provider for review/approval because:  Refill when seen through [facility]??

## 2021-12-26 ENCOUNTER — ANCILLARY PROCEDURE (OUTPATIENT)
Dept: MRI IMAGING | Facility: CLINIC | Age: 46
End: 2021-12-26
Attending: INTERNAL MEDICINE
Payer: COMMERCIAL

## 2021-12-26 DIAGNOSIS — Z09 FOLLOW UP: ICD-10-CM

## 2021-12-26 PROCEDURE — A9585 GADOBUTROL INJECTION: HCPCS | Performed by: RADIOLOGY

## 2021-12-26 PROCEDURE — 77049 MRI BREAST C-+ W/CAD BI: CPT | Performed by: RADIOLOGY

## 2021-12-26 RX ORDER — GADOBUTROL 604.72 MG/ML
7.5 INJECTION INTRAVENOUS ONCE
Status: COMPLETED | OUTPATIENT
Start: 2021-12-26 | End: 2021-12-26

## 2021-12-26 RX ADMIN — GADOBUTROL 7.5 ML: 604.72 INJECTION INTRAVENOUS at 13:07

## 2021-12-26 NOTE — DISCHARGE INSTRUCTIONS
MRI Contrast Discharge Instructions      We slowed the rate of injection today to minimize the nausea/hot flashes. It would be best served to remind the techs for your next breast MRI to do the same! Thanks.  The IV contrast you received today will pass out of your body in your  urine. This will happen in the next 24 hours. You will not feel this process.  Your urine will not change color.    Drink at least 4 extra glasses of water or juice today (unless your doctor  has restricted your fluids). This reduces the stress on your kidneys.  You may take your regular medicines.    If you are on dialysis: It is best to have dialysis today.    If you have a reaction: Most reactions happen right away. If you have  any new symptoms after leaving the hospital (such as hives or swelling),  call your hospital at the correct number below. Or call your family doctor.  If you have breathing distress or wheezing, call [phone].    Special instructions: ***    I have read and understand the above information.    Signature:______________________________________ Date:___________    Staff:__________________________________________ Date:___________     Time:__________    [place] [facility] Departments:    ___[facility]: [phone]  ___[facility]: [phone]  ___[place]: [phone] ___[place]: [phone]  ___[facility]: [phone]  ___[facility]: [phone]  ___[phone]  ___[facility]: [phone]  ___[phone]

## 2022-01-02 ENCOUNTER — HEALTH MAINTENANCE LETTER (OUTPATIENT)
Age: 47
End: 2022-01-02

## 2022-01-03 RX ORDER — TRIAMCINOLONE ACETONIDE 1 MG/G
CREAM TOPICAL
Qty: 30 G | Refills: 11 | Status: SHIPPED | OUTPATIENT
Start: 2022-01-03

## 2022-06-07 NOTE — LETTER
9/18/2020         RE: Jairo Prieto  [address] [place] [address]        Dear Colleague,    Thank you for referring your patient, Jairo Prieto, to the [facility]. Please see a copy of my visit note below.    Dear Dr. Vogel,  I had the pleasure of seeing                      Cancer Diagnosis    Diagnosis:  T cell lymphoblastic lymphoma   Date of Diagnosis:  7/24/1985 Age at Diagnosis:  9 years Date Therapy Completed: 2/1987   Sites involved/stage/diagnostic details: mediastinum, CNS-, BM- Laterality:  right   Hereditary/congenital history:    Pertinent past medical history:       Treatment Center:   [facility] Medical Record #:  [id number]   MD/APN Contact Information:  Dr. Keven Lino   Relapse(s)  NONE   Subsequent Malignant Neoplasm - NONE   CANCER TREATMENT SUMMARY    Protocol       Acronym/Number Title/Description   Initiated Completed On-Study   [facility] [place] [facility] Newly diagnosed NHL 7/25/1985 2/1987 NO   Surgery    Date  Procedure Site (if applicable) Laterality (if applicable) Surgeon/Institution   7/24/1985 Fine needle aspirate Anterior mediastinum right [facility]   Chemotherapy      Drug Name Route Cumulative Dose   Cyclophosphamide  Prednisone  Vincristine  Daunorubicin  Cytarabine  Methotrexate  6 Thioguanine  L asparaginase  BCNU  Hydroxyurea IV  PO  IV  IV  IV/IT  IT/PO  PO  IM  IV  PO 6 GM/m2        360 mg/m2  5800 mg/m2 (IV)/77 mg/m2 (IT)  182 mg/m2 (IT)        270 mg/m27     **all doses are estimated**   Bioimmunotherapy - NONE   Radiation  YES   Site/Field Laterality Start Stop   Fractions Dose per Fraction (cGy) Total Dose (cGy) Type     Mediastinum Anterior/posterior 11/1/1985 11/15/1985 10 150 cGy 1500 cGy       Radiation oncologist:  Keven Verdin   Institution:  [facility] [place]                Transplant  None   Complications/Late Effects      Problem Date onset Date resolved Status   Thyroid Nodules- now  Patient will be seen on Deborah 10   s/p total thyroidectomy 5/18/2012   Levothyroxine replacement   Adverse Drug Reactions/Allergies  - NONE    Additional Information/Comments    Labs/studies to consider:  TSH yearly, needs ECHO every 2 years, mammography and breast MRI yearly, consider   PFTs 6/25/2015 WNL; repeat PRN  Immunization status?  Hep and HIV serologies negative in 2012      Summary prepared by:  Jessica Murray Date prepared:  5/4/2012   Summary updated by: Sam Brooks Date updated: 9/18/2020        Interval History  Ms. Jerardo Prieto is doing well and was last seen in [facility] clinic 2.5 years ago. She's been well but ROS as below remarkable for palpitations intermittently (monthly). No chest pain, dyspnea, decreased exercise tolerance. Sedentary work but walks the dog regularly. Has seen Dermatology and had a biopsy showing spongioform dermatitis- eczematous- gets flares now and then- uses creams for this. Now 44, has not had breast MRI screening despite high-risk status.     PSH  Total thyroidectomy     Current Outpatient Medications   Medication     calcium-vitamin D 500-125 MG-UNIT TABS     cetirizine (ZYRTEC ALLERGY) 10 MG tablet     ibuprofen (ADVIL,MOTRIN) 200 MG tablet     levothyroxine (SYNTHROID, LEVOTHROID) 150 MCG tablet     Multiple Vitamin (MULTI-VITAMIN PO)     OMEGA-3 FATTY ACIDS PO     triamcinolone (KENALOG) 0.1 % cream     azelastine (ASTELIN) 0.1 % spray     Cholecalciferol 79610 UNIT TABS     No current facility-administered medications for this visit.           Review Of Systems  Skin: rash, itching- 5 mos rash with skin biopsy showing spongioform dermatitis  Eyes: negative  Ears/Nose/Throat: sinus trouble  Respiratory: No shortness of breath, dyspnea on exertion, cough, or hemoptysis  Cardiovascular: positive for negative and palpitations  Gastrointestinal: negative  Genitourinary: negative  Musculoskeletal: generalized aches, pains, stiffness in upper torso, neck, shoulders  Neurologic: negative  Psychiatric:  "negative specifically denies PTSH, MELIA, MDD symptoms  Hematologic/Lymphatic/Immunologic: negative  Endocrine: negative     Family History  PGM  of breast cancer- age 50     Social History  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 Work station not ergonomically optimized (question whether her upper body/neck myofascial symptoms relate to this)  Feels safe at home.  No tobacco or drugs.  Excess alcohol use- no FH of alcoholism. Discussed high risk markers (>14 drinks/wk or >4 drinks/d)  1 cat, 1 dog.  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     Examination  /78   Pulse 81   Temp 98.5  F (36.9  C) (Tympanic)   Resp 18   Ht 1.845 m (6' 0.64\")   Wt 88 kg (194 lb)   SpO2 99%   BMI 25.85 kg/m      EXAM:  Constitutional: healthy, alert and no distress  Head: Normocephalic. No masses, lesions, tenderness or abnormalities  Neck: Neck supple. No adenopathy. Thyroid symmetric, normal size,, Carotids without bruits.  ENT: ENT exam normal, no neck nodes or sinus tenderness  Cardiovascular: negative, PMI normal. No lifts, heaves, or thrills. RRR. No murmurs, clicks gallops or rub  Respiratory: negative, Percussion normal. Good diaphragmatic excursion. Lungs clear  Gastrointestinal: Abdomen soft, non-tender. BS normal. No masses, organomegaly  : Deferred  Musculoskeletal: extremities normal- no gross deformities noted, gait normal and normal muscle tone  Skin: excoriation - upper extremities, exanthem upper chest. No dermatographism but some eczematous changes on external ears.   Neurologic: Gait normal. Reflexes normal and symmetric. Sensation grossly WNL.  Psychiatric: mentation appears normal and affect normal/bright  Hematologic/Lymphatic/Immunologic: Normal cervical lymph nodes     Assessment and Plan:  Ms. Kurtz is a healthy 43 yo seen for longterm follow-up of her treatment for Non-Hodgkins Lymphoma.  She is doing well but " will need surveillance studies and on-going screening based on the treatment she received.     1)  Non-Hodgkin Lymphoma.  She is now over 32 years s/p completion of therapy so the risk of recurrence is exceedingly low.  No family history to suggest a family cancer syndrome but the early breast cancer in her paternal grandmother increases her already elevated risk.     2)  Chemotherapy late effects.  The cyclophosphamide can effect the body in many ways, but this far out from treatment the reproductive system and the urinary tract are the main systems we worry about.  Given the normal onset of menstruation and normal pubertal development, ovarian function was preserved but fertility was likely affected.  As for the urinary tract, microhematuria is common and should be checked annually with urinalysis.  Kidney dysfunction is more rare and we'd recommend annual BP screening and creatinine measurement for any hypertension.  The anthracycline exposure (daunomycin) does predispose to late cardiac effects.  The cumulative dose and coincident radiation therapy merit annual echocardiogram to make sure no cardiomyopathy develops. This should be scheduled. In addition, the reported palpitations merit Holter monitoring in this setting. Finally, the intra-thecal methotrexate can cause changes in cognition and learning disabilities.  Ms. Kurtz has clearly done well in school and is doing well at work - she denies any PTSD, anxiety or depression symptoms. If any changes occur, we'd recommend formal Neuropsych testing. Lastly, the prednisone and MTX can affect bone development and particularly in light of her h/o wrist fracture, would recommend a baseline DEXA scan now.     3)  Radiation exposure.  Ms. Kurtz received mantle irradiation which exposes the chest, breasts, heart and lungs to radiation.  Thus, there is an increased risk of problems with these organs including second cancers and fibrosis.  At present, there is  no indication of any problems.  We recommend aggressive breast cancer screening given the fact that 25% of young women receiving this radiation exposure may develop a breast cancer by age 45. In addition, her family history increases this risk further. Thus annual breast MRI is indicated and we recommend annual mammography as well (6 mos apart from the MRI).  Ms. Kurtz has fibrocystic changes in both breasts so SBE is less useful.  We also recommend avoiding sun exposure to the radiated area and close follow up of any moles in these areas.  We ordered pulmonary function tests on entry into [facility] in 2012 and these were normal so don't need further testing unless symptoms develop.  Also, we discussed the increased risk of coronary artery disease after mantle radiation and the importance of other risk factor reduction (cholesterol and blood pressure) and daily exercise.       4)  Other related follow-up issues.  Dental late effects are common and she's had some receding gums but o/w good dental health.  Eye issues are less common but we'd recommend a baseline eye exam and glaucoma screening. She received blood transfusions during her treatment and is Hepatitis C and HIV negative (tested in 2012)     5) Rash. The rash is pruritic and has been persistent for over 5 mos. Her h/o eczema suggests a pre-disposition to atopy and the biopsy is reassuring but if the patch testing is negative and symptoms continue, would consider a second opinion at a referral center with review of the original dermatopath sample. Lencho Reeves MD, here at the [facility] would be my recommendation. I would also consider serum tryptase measurement looking for abnormal mast cell activation.      6) Healthcare Maintenance. Ms. Jerardo Prieto has established primary care which is critical. I would like for her to get her testing through her usual clinic if possible- but if Dr. Vogel prefers, am also happy to order it all here.  She requires prior authorization for all testing.     In summary, I would recommend the following testing be ordered:     A) Breast MRI/alternating with Mammography for HIGH-RISK breast cancer screening  B) DEXA scan for risk factors for osteoporosis in setting of past wrist fx with osteopenia on Xray at that time  C) Echocardiogram biannually (high risk for cardiomyopathy after XRT and anthracycline therapy) and Zio Patch for palpitations.       It was a pleasure to meet Ms. Kurtz today and to be involved in her care.  Do not hesitate to contact me with any questions regarding this evaluation and my recommendations.     Sincerely,      Home Brooks MD, FACP, FAAP      Dear Dr. Vogel,  I had the pleasure of seeing our mutual patient, Jairo Hendricks today, September 18, 2020, in the Long-term F/U Clinic for childhood cancer survivors here at the [facility]. See the detailed assessment and recommendations below. This summary will serve as a portable health summary for her related to her complex cancer care history.    Treatment Summary to date based on available records:                   Cancer Diagnosis    Diagnosis:  T cell lymphoblastic lymphoma   Date of Diagnosis:  7/24/1985 Age at Diagnosis:  9 years Date Therapy Completed: 2/1987   Sites involved/stage/diagnostic details: mediastinum, CNS-, BM- Laterality:  right   Hereditary/congenital history:    Pertinent past medical history:       Treatment Center:   [facility] Medical Record #:  [id number]   MD/APN Contact Information:  Dr. Keven Lino   Relapse(s)  NONE   Subsequent Malignant Neoplasm - NONE   CANCER TREATMENT SUMMARY    Protocol       Acronym/Number Title/Description   Initiated Completed [place]   [facility] [place] [facility] [place]ly diagnosed NHL 7/25/1985 2/1987 NO   Surgery    Date  Procedure Site (if applicable) Laterality (if applicable) Surgeon/Institution   7/24/1985 Fine needle aspirate Anterior  mediastinum right [facility]   Chemotherapy      Drug Name Route Cumulative Dose   Cyclophosphamide  Prednisone  Vincristine  Daunorubicin  Cytarabine  Methotrexate  6 Thioguanine  L asparaginase  BCNU  Hydroxyurea IV  PO  IV  IV  IV/IT  IT/PO  PO  IM  IV  PO 6 GM/m2        360 mg/m2  5800 mg/m2 (IV)/77 mg/m2 (IT)  182 mg/m2 (IT)        270 mg/m27     **all doses are estimated**   Bioimmunotherapy - NONE   Radiation  YES   Site/Field Laterality Start Stop   Fractions Dose per Fraction (cGy) Total Dose (cGy) Type     Mediastinum Anterior/posterior 11/1/1985 11/15/1985 10 150 cGy 1500 cGy       Radiation oncologist:  Keven Verdin   Institution:  [facility] [place]                Transplant  None   Complications/Late Effects      Problem Date onset Date resolved Status   Thyroid Nodules- now s/p total thyroidectomy 5/18/2012   Levothyroxine replacement   Adverse Drug Reactions/Allergies  - NONE    Additional Information/Comments    Labs/studies to consider:  TSH yearly, needs ECHO every 2 years, mammography and breast MRI yearly, consider   PFTs 6/25/2015 WNL; repeat PRN  DTaP in 2023, Shingrix at 52  Hep and HIV serologies negative in 2012    Anthracycline cumulative dose re-calculated based on new data (Shilpa CISNEROS, Cece WM, Julia KL, et al. Derivation of Anthracycline and Anthraquinone Equivalence Ratios to Doxorubicin for Late-Onset Cardiotoxicity. CELESTE Oncol. 2019;5(6):864-871. Doi:10.1001/jamaoncol.[email or web address]): now use 0.5 correction factor for equivalence so 180 mg/m2       Summary prepared by:  Jessica Murray Date prepared:  5/4/2012   Summary updated by: Sam Brooks Date updated: 9/18/2020        Interval History  Ms. Jerardo Prieto is doing well and was last seen in [facility] clinic 2.5 years ago. She's been well but ROS as below remarkable for palpitations intermittently (monthly). No chest pain, dyspnea, decreased exercise tolerance. Sedentary work but walks the dog regularly. Has  seen Dermatology and had a biopsy showing spongioform dermatitis- eczematous- gets flares now and then- uses creams for this. Now 44, has not had breast MRI screening despite high-risk status.     PSH  Total thyroidectomy     Current Outpatient Medications   Medication     calcium-vitamin D 500-125 MG-UNIT TABS     cetirizine (ZYRTEC ALLERGY) 10 MG tablet     ibuprofen (ADVIL,MOTRIN) 200 MG tablet     levothyroxine (SYNTHROID, LEVOTHROID) 150 MCG tablet     Multiple Vitamin (MULTI-VITAMIN PO)     OMEGA-3 FATTY ACIDS PO     triamcinolone (KENALOG) 0.1 % cream     azelastine (ASTELIN) 0.1 % spray     Cholecalciferol 45972 UNIT TABS     No current facility-administered medications for this visit.           Review Of Systems  Skin: rash improved but flares intermittently (triamcinolone cream effective- renewed)  Eyes: negative  Ears/Nose/Throat: sinus allergy symptoms- on flonase, claritin  Respiratory: No shortness of breath, dyspnea on exertion, cough, or hemoptysis  Cardiovascular: palpitations monthly, no change in exertional tolerance, no angina  Gastrointestinal: change in stools- caliber, consistency, odor. Float.  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 Work station not ergonomically optimized (question whether her upper body/neck myofascial symptoms relate to this)  Feels safe at home.  No tobacco or drugs.  Excess alcohol use- no FH of alcoholism. Discussed high risk markers (>14 drinks/wk or >4 drinks/d)  1 cat, 1 dog.  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     Examination  BP  "115/78   Pulse 81   Temp 98.5  F (36.9  C) (Tympanic)   Resp 18   Ht 1.845 m (6' 0.64\")   Wt 88 kg (194 lb)   SpO2 99%   BMI 25.85 kg/m      EXAM:  Constitutional: healthy, alert and no distress  Head: Normocephalic. No masses, lesions, tenderness or abnormalities  Neck: Neck supple. No adenopathy. Thyroid non-palpable, scar present. Carotids without bruits.  ENT: ENT exam normal, no neck nodes or sinus tenderness  Cardiovascular: negative, PMI normal. No lifts, heaves, or thrills. RRR. No murmurs, clicks gallops or rub  Respiratory: negative, Percussion normal. Good diaphragmatic excursion. Lungs clear  Gastrointestinal: Abdomen soft, non-tender. BS normal. No masses, organomegaly  : Deferred  Musculoskeletal: extremities normal- no gross deformities noted, gait normal and normal muscle tone  Skin: no rashes or worrisome moles.   Neurologic: normal mentation, gait, coordination  Psychiatric: affect normal/bright  Hematologic/Lymphatic/Immunologic: Normal cervical/inguinal lymph nodes     Assessment and Plan:  Ms. Kurtz is a healthy 45 yo seen for longterm follow-up of her treatment for Non-Hodgkins Lymphoma.  She is doing well but will need surveillance studies and on-going screening based on the treatment she received.     1)  Non-Hodgkin Lymphoma.  She is now over 32 years s/p completion of therapy so the risk of recurrence is exceedingly low.  No family history to suggest a family cancer syndrome but the early breast cancer in her paternal grandmother increases her already elevated risk.     2)  Chemotherapy late effects.  The cyclophosphamide can effect the body in many ways, but this far out from treatment the reproductive system and the urinary tract are the main systems we worry about.  Given the normal onset of menstruation and normal pubertal development, ovarian function was preserved but fertility was likely affected.  As for the urinary tract, microhematuria is common and should be checked " annually with urinalysis.  Kidney dysfunction is more rare and we'd recommend annual BP screening and creatinine measurement for any hypertension.  The anthracycline exposure (daunomycin) does predispose to late cardiac effects.  The cumulative dose has been recalculated based on a new study (Shilpa CISNEROS, Cece WM, Julia KL, et al. Derivation of Anthracycline and Anthraquinone Equivalence Ratios to Doxorubicin for Late-Onset Cardiotoxicity. CELESTE Oncol. 2019;5(6):864-871. Doi:10.1001/[email or web address]): now use 0.5 correction factor for equivalence so 180 mg/m2. This changes recs to biannual echo (done this morning and NORMAL). In addition, the reported palpitations merit Zio Patch monitoring in this setting. Finally, the intra-thecal methotrexate can cause changes in cognition and learning disabilities.  Ms. Kurtz has clearly done well in school and is doing well at work - she denies any PTSD, anxiety or depression symptoms. If any changes occur, we'd recommend formal Neuropsych testing. Lastly, the prednisone and MTX can affect bone development and particularly in light of her h/o wrist fracture, would recommend a baseline DEXA scan now (ordered). 2000 international unit(s) Vitamin D and 1200 mg calcium daily recommended.     3)  Radiation exposure.  Ms. Kurtz received mantle irradiation which exposes the chest, breasts, heart and lungs to radiation.  Thus, there is an increased risk of problems with these organs including second cancers and fibrosis.  At present, there is no indication of any problems.  We recommend aggressive breast cancer screening given the fact that 25% of young women receiving this radiation exposure may develop a breast cancer by age 45. In addition, her family history increases this risk further. Thus annual breast MRI is indicated and we recommend annual mammography as well (6 mos apart from the MRI). MRI ordered today.  Ms. Kurtz has fibrocystic changes in both  breasts so SBE is less useful.  We also recommend avoiding sun exposure to the radiated area and close follow up of any moles in these areas.  We ordered pulmonary function tests on entry into [facility] in 2012 and these were normal so don't need further testing unless symptoms develop.  Also, we discussed the increased risk of coronary artery disease after mantle radiation and the importance of other risk factor reduction (cholesterol and blood pressure) and daily exercise. This is a real and significant risk- any change in exercise tolerance or onset of exertional symptoms should prompt exercise-imaging evaluation for coronary disease.     4)  Other related follow-up issues.  Dental late effects are common and she's had some receding gums but o/w good dental health.  Eye issues are less common but we'd recommend a baseline eye exam and glaucoma screening. She received blood transfusions during her treatment and is Hepatitis C and HIV negative (tested in 2012)     5) GI symptoms.  Her change in stools, GI symptoms and h/o radiation therapy with probable scatter to abdomen merit colonoscopy evaluation (ordered). In addition, have added a fecal elastase test as her description of stool changes, greasy consistency, floating, and foul odor suggest fat malabsorption.    6) Healthcare Maintenance. Ms. Jerardo Prieto has established primary care which is critical. See above for breast cancer screening recs - she is in the highest risk category and merits annual breast MRI. Max cardiac RF reduction strategy. Regular exercise, healthy diet, moderation in EtOH intake all discussed.      It was a pleasure to meet Ms. Kurtz today and to be involved in her care.  Do not hesitate to contact me with any questions regarding this evaluation and my recommendations.     Sincerely,      Home Brooks MD, FACP, FAAP    Over 25 min of 40 min visit spent in care coordination and counseling related to the above issues.       Home Brooks MD, FACP, FAAP

## 2022-09-21 ENCOUNTER — TELEPHONE (OUTPATIENT)
Dept: INTERNAL MEDICINE | Facility: CLINIC | Age: 47
End: 2022-09-21

## 2022-09-21 DIAGNOSIS — R53.83 FATIGUE, UNSPECIFIED TYPE: ICD-10-CM

## 2022-09-21 DIAGNOSIS — Z13.88 RADIATION EXPOSURE SCREEN: ICD-10-CM

## 2022-09-22 ENCOUNTER — ANCILLARY PROCEDURE (OUTPATIENT)
Dept: MAMMOGRAPHY | Facility: CLINIC | Age: 47
End: 2022-09-22
Attending: FAMILY MEDICINE
Payer: COMMERCIAL

## 2022-09-22 DIAGNOSIS — Z12.31 VISIT FOR SCREENING MAMMOGRAM: ICD-10-CM

## 2022-09-22 PROCEDURE — 77067 SCR MAMMO BI INCL CAD: CPT | Performed by: STUDENT IN AN ORGANIZED HEALTH CARE EDUCATION/TRAINING PROGRAM

## 2022-09-22 PROCEDURE — 77063 BREAST TOMOSYNTHESIS BI: CPT | Performed by: STUDENT IN AN ORGANIZED HEALTH CARE EDUCATION/TRAINING PROGRAM

## 2022-10-03 DIAGNOSIS — Z91.89 AT RISK FOR CARDIOMYOPATHY: ICD-10-CM

## 2022-10-03 DIAGNOSIS — Z09 CHEMOTHERAPY FOLLOW-UP EXAMINATION: ICD-10-CM

## 2022-10-03 DIAGNOSIS — Z92.3 HISTORY OF RADIATION THERAPY: ICD-10-CM

## 2022-10-03 DIAGNOSIS — Z85.72 HISTORY OF LYMPHOMA: ICD-10-CM

## 2022-10-03 DIAGNOSIS — E03.9 ACQUIRED HYPOTHYROIDISM: Primary | ICD-10-CM

## 2022-11-03 ENCOUNTER — ANCILLARY PROCEDURE (OUTPATIENT)
Dept: CARDIOLOGY | Facility: CLINIC | Age: 47
End: 2022-11-03
Attending: NURSE PRACTITIONER
Payer: COMMERCIAL

## 2022-11-03 DIAGNOSIS — Z91.89 AT RISK FOR CARDIOMYOPATHY: ICD-10-CM

## 2022-11-03 DIAGNOSIS — Z92.3 HISTORY OF RADIATION THERAPY: ICD-10-CM

## 2022-11-03 DIAGNOSIS — Z85.72 HISTORY OF LYMPHOMA: ICD-10-CM

## 2022-11-03 DIAGNOSIS — Z09 CHEMOTHERAPY FOLLOW-UP EXAMINATION: ICD-10-CM

## 2022-11-03 LAB — LVEF ECHO: NORMAL

## 2022-11-03 PROCEDURE — 93306 TTE W/DOPPLER COMPLETE: CPT | Performed by: INTERNAL MEDICINE

## 2022-11-19 ENCOUNTER — HEALTH MAINTENANCE LETTER (OUTPATIENT)
Age: 47
End: 2022-11-19

## 2023-04-09 ENCOUNTER — HEALTH MAINTENANCE LETTER (OUTPATIENT)
Age: 48
End: 2023-04-09

## 2023-09-27 ENCOUNTER — TRANSCRIBE ORDERS (OUTPATIENT)
Dept: OTHER | Age: 48
End: 2023-09-27

## 2023-09-27 DIAGNOSIS — Z85.72 HISTORY OF NON-HODGKIN'S LYMPHOMA: Primary | ICD-10-CM

## 2023-12-08 DIAGNOSIS — Z09 CHEMOTHERAPY FOLLOW-UP EXAMINATION: Primary | ICD-10-CM

## 2024-01-27 ENCOUNTER — HEALTH MAINTENANCE LETTER (OUTPATIENT)
Age: 49
End: 2024-01-27

## 2024-04-04 ENCOUNTER — ANCILLARY PROCEDURE (OUTPATIENT)
Dept: CARDIOLOGY | Facility: CLINIC | Age: 49
End: 2024-04-04
Attending: INTERNAL MEDICINE
Payer: COMMERCIAL

## 2024-04-04 ENCOUNTER — ANCILLARY PROCEDURE (OUTPATIENT)
Dept: MRI IMAGING | Facility: CLINIC | Age: 49
End: 2024-04-04
Attending: INTERNAL MEDICINE
Payer: COMMERCIAL

## 2024-04-04 ENCOUNTER — ANCILLARY PROCEDURE (OUTPATIENT)
Dept: BONE DENSITY | Facility: CLINIC | Age: 49
End: 2024-04-04
Attending: INTERNAL MEDICINE
Payer: COMMERCIAL

## 2024-04-04 DIAGNOSIS — Z09 CHEMOTHERAPY FOLLOW-UP EXAMINATION: ICD-10-CM

## 2024-04-04 DIAGNOSIS — Z92.3 HISTORY OF RADIATION EXPOSURE: ICD-10-CM

## 2024-04-04 LAB — LVEF ECHO: NORMAL

## 2024-04-04 PROCEDURE — 93356 MYOCRD STRAIN IMG SPCKL TRCK: CPT | Performed by: INTERNAL MEDICINE

## 2024-04-04 PROCEDURE — A9585 GADOBUTROL INJECTION: HCPCS

## 2024-04-04 PROCEDURE — 93306 TTE W/DOPPLER COMPLETE: CPT | Performed by: INTERNAL MEDICINE

## 2024-04-04 PROCEDURE — 77049 MRI BREAST C-+ W/CAD BI: CPT

## 2024-04-04 PROCEDURE — 77080 DXA BONE DENSITY AXIAL: CPT | Performed by: INTERNAL MEDICINE

## 2024-04-04 RX ORDER — GADOBUTROL 604.72 MG/ML
10 INJECTION INTRAVENOUS ONCE
Status: COMPLETED | OUTPATIENT
Start: 2024-04-04 | End: 2024-04-04

## 2024-04-04 RX ADMIN — GADOBUTROL 9 ML: 604.72 INJECTION INTRAVENOUS at 11:29

## 2024-04-19 ENCOUNTER — ONCOLOGY VISIT (OUTPATIENT)
Dept: ONCOLOGY | Facility: CLINIC | Age: 49
End: 2024-04-19
Attending: INTERNAL MEDICINE
Payer: COMMERCIAL

## 2024-04-19 VITALS
BODY MASS INDEX: 25.86 KG/M2 | RESPIRATION RATE: 16 BRPM | DIASTOLIC BLOOD PRESSURE: 83 MMHG | HEART RATE: 69 BPM | TEMPERATURE: 98.4 F | SYSTOLIC BLOOD PRESSURE: 127 MMHG | OXYGEN SATURATION: 99 % | WEIGHT: 190.9 LBS | HEIGHT: 72 IN

## 2024-04-19 DIAGNOSIS — Z85.72 HISTORY OF NON-HODGKIN'S LYMPHOMA: Primary | ICD-10-CM

## 2024-04-19 DIAGNOSIS — R00.2 PALPITATIONS: ICD-10-CM

## 2024-04-19 DIAGNOSIS — Z92.3 HISTORY OF MANTLE FIELD RADIATION THERAPY: ICD-10-CM

## 2024-04-19 PROCEDURE — 99215 OFFICE O/P EST HI 40 MIN: CPT | Performed by: INTERNAL MEDICINE

## 2024-04-19 PROCEDURE — 99213 OFFICE O/P EST LOW 20 MIN: CPT | Performed by: INTERNAL MEDICINE

## 2024-04-19 PROCEDURE — 99417 PROLNG OP E/M EACH 15 MIN: CPT | Performed by: INTERNAL MEDICINE

## 2024-04-19 RX ORDER — PANTOPRAZOLE SODIUM 40 MG/1
40 TABLET, DELAYED RELEASE ORAL
COMMUNITY
Start: 2023-10-03 | End: 2024-10-02

## 2024-04-19 RX ORDER — DOXYCYCLINE HYCLATE 100 MG
100 TABLET ORAL
COMMUNITY
Start: 2024-04-15 | End: 2024-04-25

## 2024-04-19 ASSESSMENT — PAIN SCALES - GENERAL: PAINLEVEL: NO PAIN (0)

## 2024-04-19 NOTE — LETTER
2024         RE: Jairo Prieto  [address] [place] [address]        Dear Colleague,    Thank you for referring your patient, Jairo Prieto, to the [facility]. Please see a copy of my visit note below.    Childhood Cancer/BMT Survivor Program (cCSP) Progress Note     ASSESSMENT AND PLAN   Ms. 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 She was last seen in  and since that time has completed the recommended labs and studies including 2 normal echos (q2yrs), a normal colonoscopy (q5yrs), 2 breast MRIs (one with negative biopsy on f/up and most recently normal) alternating with mammography (annual for each and 6 mos offset), DEXA scan that showed normal bone density (prn), and reassuring labs. With respect to new concerns, we discussed the followin) Myofascial pain and strain in back and neck. Suspect predisposition from XRT and mild scoliosis now much worse due to poor ergonomics of home work station. Correcting these ergonomics and considering PT/OT and yoga exercises daily as part of a preventive/treatment regimen is recommended. 2) Palpitations with prominent neck vein pulsations. This was worked up with a neck CT and now anatomic or vascular abonormalities were noted. Echo is normal. Recommend ZIO patch monitoring for 48 hours to assess for dysrhythmia (most likely PVCs/PACs) causing nuha a-waves visible in neck. Given chest XRT she is at higher risk of late heart complications and regular echos are thankfully normal. Finally we discussed the importance of regular primary care and she is getting this at her clinic.    HISTORY OF PRESENT ILLNESS   Ms. Hendricks is doing well without major concerns today. She has undergone appropriate surveillance screening  "per her LTFU plan and no evidence of new late effects have been noted in the interval since her last visit in 2020. She reports upper back and neck myofascial symptoms much improved with massage and yoga. She     PAST MEDICAL HISTORY   PMH: PAST MEDICAL HISTORY:   Past Medical History:   Diagnosis Date    Eczema of external ear     History of thyroidectomy, total 11/24/1992    Performed due to thyroid nodules s/p XRT. Parathyroid autotransplant done.    Non Hodgkin's lymphoma (H) Diagnosed 1985    Cumulative Anthracycline 270 mg/m2, Cyclophosphamide 4.2 gm/M2, Mantle irradiation 1500 cgy    Wrist fracture, left 2007    Normal DEXA after     [facility]:FAMILY HISTORY:   Family History   Problem Relation Age of Onset    Breast Cancer Paternal Grandmother 50    Neurologic Disorder Sister         Multiple Sclerosis     Social Hx: , works in "[employer] with [employer] ([employer] with some travel). Strong social support.   Surgical Hx: Total thyroidectomy  Allergies:    Allergies   Allergen Reactions    Seasonal Allergies Itching     Sinus Infections      Immunizations: up to date and documented    REVIEW OF SYSTEMS   A comprehensive review of systems was performed and was negative unless noted in the HPI.     PHYSICAL EXAM   VITALS: All vitals have been reviewed  /83   Pulse 69   Temp 98.4  F (36.9  C) (Oral)   Resp 16   Ht 1.84 m (6' 0.44\")   Wt 86.6 kg (190 lb 14.4 oz)   SpO2 99%   BMI 25.58 kg/m     0 lbs 0 oz   6' .441\"     Exam:  Constitutional: healthy, alert, and no distress  Head: Normocephalic. No masses, lesions, tenderness or abnormalities  Neck: Neck supple. No adenopathy. Thyroidectomy scar noted, some right hyoid prominence unchanged (noted with swallow), Carotids without bruits.  ENT: ENT exam normal, no neck nodes or sinus tenderness  Cardiovascular: negative, PMI normal. No lifts, heaves, or thrills. RRR. No murmurs, clicks gallops or rub  Respiratory: negative, Percussion normal. Good " diaphragmatic excursion. Lungs clear  Gastrointestinal: Abdomen soft, non-tender. BS normal. No masses, organomegaly  : Deferred  Musculoskeletal: extremities normal- no gross deformities noted though slight winging of right scapula, gait normal, and normal muscle tone  Skin: no suspicious lesions or rashes  Neurologic: Gait normal. Reflexes normal and symmetric. Sensation grossly WNL.  Psychiatric: mentation appears normal and affect normal/bright  Hematologic/Lymphatic/Immunologic: Normal cervical lymph nodes     Interval Labs and Studies   All laboratory data reviewed    Echocardiology:   Performed at [facility] recently within the past month        Interpretation Summary  Global and regional left ventricular function is normal with an EF of 60-65%.  Global right ventricular function is normal.  This study was compared with the study from 2020 .  No significant changes noted.         All imaging studies reviewed by me. DEXA with normal bone density    Colonoscopy with biopsies NORMAL in 12/2020    Breast MRI normal in 2024  Breast biopsy normal in 2021    Endometrial biopsy in 4/2024 with chronic endometritis without atypia    Paps UTD with negative high-risk HPV testing    Nl CMP, TSH    MEDICATIONS     Current Outpatient Medications   Medication Sig Dispense Refill    azelastine (ASTELIN) 0.1 % spray 1 spray      calcium-vitamin D 500-125 MG-UNIT TABS       cetirizine (ZYRTEC ALLERGY) 10 MG tablet Take 10 mg by mouth daily as needed.      Cholecalciferol 87465 UNIT TABS Take  by mouth. 3 wk followed by 1 week, 16 weeks total      ibuprofen (ADVIL,MOTRIN) 200 MG tablet Take 200 mg by mouth every 4 hours as needed. Approx. 2 tabs per day      levothyroxine (SYNTHROID, LEVOTHROID) 150 MCG tablet Take 150 mcg by mouth daily.      Multiple Vitamin (MULTI-VITAMIN PO) Take  by mouth. 2 tabs per day      OMEGA-3 FATTY ACIDS PO Take 2 g by mouth      triamcinolone (KENALOG) 0.1 % external cream APPLY TO  AFFECTED AREAS TWICE DAILY AS NEEDED FOR ITCHING 30 g 11     No current facility-administered medications for this visit.       SURVIVOR CARE PLAN   TREATMENT HISTORY  Diagnosis: T-Cell Lymphoblastic Lymphoma  Date of Diagnosis: 7/24/1985  Age at Diagnosis: 9 yrs, 8 mos  Date Treatment Completed: 2/1987  Chemotherapy History:     Radiation Therapy History:     Bone Marrow Transplant/Cellular Treatment History: NONE    LATE EFFECTS  Thyroid nodules- now s/p total thyroidectomy in 2012    NEW LATE EFFECTS  Palpitations    SURVEILLANCE PLAN  RISK OF RECURRENCE: Jairo Prieto is >36 years out from the completion of therapy for T-Cell Lymphoma. Given the time from the diagnosis of her primary condition, the likelihood of recurrence is exceedingly low.    PSYCHOSOCIAL EFFECTS/MENTAL HEALTH:  Educational and emotional issues are common for childhood cancer survivors. Emotional distress from cancer treatment and recovery affects the patient, their caregivers and family members. Depending on diagnosis, age at treatment, and type of treatments, difficulties with memory, learning, behavior, or emotional health may occur. In addition to [place] public laws that protect the rights of students with educational problems related to cancer treatment, childhood cancer survivors can also seek additional support through our Social Work team, community mental health professionals, and school support services. Annual screening with PHQ-2 or 9 and MELIA-7 recommended with referral as appropriate.  ACCESS TO HEALTH CARE AND INSURANCE: This can be an issue for survivors over time. Our team has personnel with expertise in this area (e.g. social workers) who can assist with related barriers. You have had insurance through work and if anything changes please reach out to us for help.  DIET AND PHYSICAL ACTIVITY: The American Cancer Society recommends at least 150 minutes of moderate physical activity or 75 minutes of vigorous  physical activity, or a combination of these, each week for adults. Children and adolescents should engage in at least 60 minutes each day of moderate to vigorous physical activity with vigorous activity at least 3 days each week. Survivors who have special needs can take part in most activities, but the help of a PT or OT may be needed to adapt the activity for success. *Always consult with a Healthcare Professional before beginning any new exercise routines to ensure this is safe for you.  HEART HEALTH: Treatment with a class of medications called anthracyclines and/or radiation to the chest (including total body irradiation) can lead to problems with the heart. Heart problems may occur many years after treatment. There are several steps survivors can take to keep their heart healthy, including regular medical check-ups including blood pressure checks, and heart ultrasounds (called echocardiography). Based on your treatments, it is recommended you undergo echocardiogram screening every 2 years. If you notice new, changed or severe chest pain, unexplained shortness of breath, or unexplained swelling, please notify a medical provider immediately.   NEUROCOGNITIVE HEALTH: Many childhood cancer survivors are at risk for neurocognitive deficits as a result of the disruption to their early schooling for their necessary oncology treatments and from specific treatments including brain radiation and intra-thecal chemotherapy.  If any changes are noted regarding neurocognitive function (memory issues, information processing, comprehension, etc) or psychological health please let us and your primary care provider know immediately.You have not had issues identified in your education journey including high school and college.   BONE HEALTH:  Exposure to steroids, chemotherapy can increase risk for long-term poor bone health, such as decreased bone mineral density. This will has been assessed with a baseline DEXA scan and your  most recent scan showed NORMAL bone density. It should be repeated on an as-needed basis moving forward. Vitamin D level should be checked regularly as well in case you need to take supplements. We recommend regular weight-bearing exercise, avoiding too much soda, and maintaining a healthy diet.  DENTAL HEALTH: Cancer treatments can lead to an increased risk for dental decay (cavities) and routine preventative dental care should occur every 6 months with a dentist.    EYE HEALTH:  Exposures to certain therapies (steroids, methotrexate, radiation including the head, etc.) can increase risk for cataracts. Routine eye exams should be performed as part of long-term follow-up care, along with annual optometry or ophthalmology exams.  GASTROINTESTINAL HEALTH: Treatment of childhood cancers can cause scarring and chornic problems of the intestines or other parts of the gastrointestinal system.  The types of problems can vary depending on the treatment that was given.  Especially after radiation that was delivered to or around the abdomen, any severe or unexplained abdominal pain should be checked out by a medical provider immediately. Your colonoscopy included biopsies throughout the lower GI tract and all were normal without any adenomatous polyps.   LIVER HEALTH: Chemotherapies and radiation can impact your liver health. Baseline lab evaluation has been done (normal) and will only be followed in the future on an as needed basis if liver problems develop. Potential signs of liver impairment include but are not limited to: yellowing of the skin or the white part of the eyes.  KIDNEY AND BLADDER HEALTH: Chemotherapies and radiation can impact your kidney and bladder health. Baseline lab evaluation will be ordered and will only be followed in the future on an as needed basis if baseline abnormalities are seen or if kidney or bladder problems develop. Potential signs of kidney/bladder impairment include but are not limited  to: high blood pressure, blood in the urine, pain with urination.  LUNG HEALTH:  As a result of certain treatments, particularly chest radiation, total body irradiation or chemo like bleomycin or busulfan chemotherapy, there is a risk of pulmonary problems. Pulmonary function tests have been done as a baseline and should be repeated as needed in the future if symptoms occur.  PERIPHERAL NEUROPATHY: Certain chemotherapies may cause the hands or feet to hurt, tingle and feel numb or weak, due to damage to the peripheral nerves. There is no treatment that can cure or reverse nerve damage, treatment is directed toward symptom management.  Neurology or rehab services such as, physical therapy (PT) or occupational therapy (OT) may help manage symptoms.  IMMUNE HEALTH & VACCINES: You can receive any vaccines that your primary care provider (PCP) or Survivorship team recommends. Please confirm with your PCP that your vaccines are up to date, including the vaccine for HPV. As a cancer survivor, the HPV vaccination should be a 3-dose series.   METABOLIC HEALTH: Survivors are at increased risk for obesity, high cholesterol, and high blood pressure. A lipid panel for cholesterol and triglyceride screening should take place regularly so that interventions can be initiated promptly. These were reviewed and in acceptable ranges.  HORMONAL HEALTH: Survivors are at an increased risk for hormone changes including infertility.  Blood or bodily fluids may be tested if concerns are present.   SKIN HEALTH:  After radiation exposure, there is a risk of non-melanoma skin cancers.  Close attention to sun protection and annual follow-up with Dermatology is strongly recommended.  RISK OF SECONDARY CANCERS: Survivors of childhood cancer/BMT and genetic disorders are at increased risk for developing subsequent cancers compared to the general population. This is primarily due to exposure to radiation and certain chemotherapies, and for some  individuals is secondary to an underlying genetic susceptibility. Regular physical examination is important and changes or concerns (new lumps, bumps, changes in medical status) should be reported to a primary care provider or to the Childhood Cancer Survivor Program.  In addition, screening or monitoring for certain cancers is recommended for the following.    Skin Cancer: There is a risk of second cancers to the skin, soft tissues and bones within the field of prior radiation. Close attention to sun protection and annual follow-up with Dermatology is strongly recommended. , Leukemia/Myelodysplastic Syndrome: There is risk for second cancers of the blood such as acute leukemia or myelodysplasia due to certain chemo drugs. This risk is highest in the 5-10 years after exposure; thus, we will check blood counts annually for 10 years (long completed!). Any concerning signs or symptoms, including unexplained fevers, bleeding or bruising, extreme fatigue or enlarged glands/swollen lymph nodes should be shared with a medical provider., Breast Cancer: There is a risk of secondary breast cancer, early breast cancer screening is recommended.  Annual mammogram/MRI starting 8 years after chest radiation or age 25, whichever is later. You are up to date on this.       Total time spent on the following services on the date of the encounter: Preparing to see patient, chart review, review of outside records, Ordering medications, test, procedures, chemotherapy, Referring or communicating with other healthcare professionals, Interpretation of labs, imaging and other tests, Performing a medically appropriate examination , Counseling and educating the patient/family/caregiver , Documenting clinical information in the electronic or other health record , Communicating results to the patient/family/caregiver, Care coordination.  Total amount of time performed on this outpatient visit: 80 minutes.     Home Brooks MD

## 2024-04-19 NOTE — NURSING NOTE
"Oncology Rooming Note    April 19, 2024 10:14 AM   Jairo Prieto is a 48 year old female who presents for:    Chief Complaint   Patient presents with    Oncology Clinic Visit     New Eval for NHL     Initial Vitals: /83   Pulse 69   Temp 98.4  F (36.9  C) (Oral)   Resp 16   Ht 1.84 m (6' 0.44\")   Wt 86.6 kg (190 lb 14.4 oz)   SpO2 99%   BMI 25.58 kg/m   Estimated body mass index is 25.58 kg/m  as calculated from the following:    Height as of this encounter: 1.84 m (6' 0.44\").    Weight as of this encounter: 86.6 kg (190 lb 14.4 oz). Body surface area is 2.1 meters squared.  No Pain (0) Comment: Data Unavailable   No LMP recorded.  Allergies reviewed: Yes  Medications reviewed: Yes    Medications: Medication refills not needed today.  Pharmacy name entered into [facility]: [id number] [place] [id number] [address]    Frailty Screening:   Is the patient here for a new oncology consult visit in cancer care? 2. No      Clinical concerns: none       Fariha Coleman MA             "

## 2024-04-19 NOTE — PROGRESS NOTES
Childhood Cancer/BMT Survivor Program (cCSP) Progress Note     ASSESSMENT AND PLAN   Ms. 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 She was last seen in  and since that time has completed the recommended labs and studies including 2 normal echos (q2yrs), a normal colonoscopy (q5yrs), 2 breast MRIs (one with negative biopsy on f/up and most recently normal) alternating with mammography (annual for each and 6 mos offset), DEXA scan that showed normal bone density (prn), and reassuring labs. With respect to new concerns, we discussed the followin) Myofascial pain and strain in back and neck. Suspect predisposition from XRT and mild scoliosis now much worse due to poor ergonomics of home work station. Correcting these ergonomics and considering PT/OT and yoga exercises daily as part of a preventive/treatment regimen is recommended. 2) Palpitations with prominent neck vein pulsations. This was worked up with a neck CT and now anatomic or vascular abonormalities were noted. Echo is normal. Recommend ZIO patch monitoring for 48 hours to assess for dysrhythmia (most likely PVCs/PACs) causing nuha a-waves visible in neck. Given chest XRT she is at higher risk of late heart complications and regular echos are thankfully normal. Finally we discussed the importance of regular primary care and she is getting this at her clinic.    HISTORY OF PRESENT ILLNESS   Ms. Hendricks is doing well without major concerns today. She has undergone appropriate surveillance screening per her LTFU plan and no evidence of new late effects have been noted in the interval since her last visit in . She reports upper back and neck myofascial symptoms much improved with massage and yoga. She     PAST MEDICAL HISTORY   PMH: PAST MEDICAL HISTORY:   Past Medical History:  "  Diagnosis Date    Eczema of external ear     History of thyroidectomy, total 11/24/1992    Performed due to thyroid nodules s/p XRT. Parathyroid autotransplant done.    Non Hodgkin's lymphoma (H) Diagnosed 1985    Cumulative Anthracycline 270 mg/m2, Cyclophosphamide 4.2 gm/M2, Mantle irradiation 1500 cgy    Wrist fracture, left 2007    Normal DEXA after     [facility]:FAMILY HISTORY:   Family History   Problem Relation Age of Onset    Breast Cancer Paternal Grandmother 50    Neurologic Disorder Sister         Multiple Sclerosis     Social Hx: , works in [place] with [employer] ([employer] with some travel). Strong social support.   Surgical Hx: Total thyroidectomy  Allergies:    Allergies   Allergen Reactions    Seasonal Allergies Itching     Sinus Infections      Immunizations: up to date and documented    REVIEW OF SYSTEMS   A comprehensive review of systems was performed and was negative unless noted in the HPI.     PHYSICAL EXAM   VITALS: All vitals have been reviewed  /83   Pulse 69   Temp 98.4  F (36.9  C) (Oral)   Resp 16   Ht 1.84 m (6' 0.44\")   Wt 86.6 kg (190 lb 14.4 oz)   SpO2 99%   BMI 25.58 kg/m     0 lbs 0 oz   6' .441\"     Exam:  Constitutional: healthy, alert, and no distress  Head: Normocephalic. No masses, lesions, tenderness or abnormalities  Neck: Neck supple. No adenopathy. Thyroidectomy scar noted, some right hyoid prominence unchanged (noted with swallow), Carotids without bruits.  ENT: ENT exam normal, no neck nodes or sinus tenderness  Cardiovascular: negative, PMI normal. No lifts, heaves, or thrills. RRR. No murmurs, clicks gallops or rub  Respiratory: negative, Percussion normal. Good diaphragmatic excursion. Lungs clear  Gastrointestinal: Abdomen soft, non-tender. BS normal. No masses, organomegaly  : Deferred  Musculoskeletal: extremities normal- no gross deformities noted though slight winging of right scapula, gait normal, and normal muscle tone  Skin: no " suspicious lesions or rashes  Neurologic: Gait normal. Reflexes normal and symmetric. Sensation grossly WNL.  Psychiatric: mentation appears normal and affect normal/bright  Hematologic/Lymphatic/Immunologic: Normal cervical lymph nodes     Interval Labs and Studies   All laboratory data reviewed    Echocardiology:   Performed at [facility] recently within the past month        Interpretation Summary  Global and regional left ventricular function is normal with an EF of 60-65%.  Global right ventricular function is normal.  This study was compared with the study from 2020 .  No significant changes noted.         All imaging studies reviewed by me. DEXA with normal bone density    Colonoscopy with biopsies NORMAL in 12/2020    Breast MRI normal in 2024  Breast biopsy normal in 2021    Endometrial biopsy in 4/2024 with chronic endometritis without atypia    Paps UTD with negative high-risk HPV testing    Nl CMP, TSH    MEDICATIONS     Current Outpatient Medications   Medication Sig Dispense Refill    azelastine (ASTELIN) 0.1 % spray 1 spray      calcium-vitamin D 500-125 MG-UNIT TABS       cetirizine (ZYRTEC ALLERGY) 10 MG tablet Take 10 mg by mouth daily as needed.      Cholecalciferol 74345 UNIT TABS Take  by mouth. 3 wk followed by 1 week, 16 weeks total      ibuprofen (ADVIL,MOTRIN) 200 MG tablet Take 200 mg by mouth every 4 hours as needed. Approx. 2 tabs per day      levothyroxine (SYNTHROID, LEVOTHROID) 150 MCG tablet Take 150 mcg by mouth daily.      Multiple Vitamin (MULTI-VITAMIN PO) Take  by mouth. 2 tabs per day      OMEGA-3 FATTY ACIDS PO Take 2 g by mouth      triamcinolone (KENALOG) 0.1 % external cream APPLY TO AFFECTED AREAS TWICE DAILY AS NEEDED FOR ITCHING 30 g 11     No current facility-administered medications for this visit.       SURVIVOR CARE PLAN   TREATMENT HISTORY  Diagnosis: T-Cell Lymphoblastic Lymphoma  Date of Diagnosis: 7/24/1985  Age at Diagnosis: 9 yrs, 8 mos  Date Treatment  Completed: 2/1987  Chemotherapy History:     Radiation Therapy History:     Bone Marrow Transplant/Cellular Treatment History: NONE    LATE EFFECTS  Thyroid nodules- now s/p total thyroidectomy in 2012    NEW LATE EFFECTS  Palpitations    SURVEILLANCE PLAN  RISK OF RECURRENCE: Jairo Prieto is >36 years out from the completion of therapy for T-Cell Lymphoma. Given the time from the diagnosis of her primary condition, the likelihood of recurrence is exceedingly low.    PSYCHOSOCIAL EFFECTS/MENTAL HEALTH:  Educational and emotional issues are common for childhood cancer survivors. Emotional distress from cancer treatment and recovery affects the patient, their caregivers and family members. Depending on diagnosis, age at treatment, and type of treatments, difficulties with memory, learning, behavior, or emotional health may occur. In addition to [place] public laws that protect the rights of students with educational problems related to cancer treatment, childhood cancer survivors can also seek additional support through our Social Work team, community mental health professionals, and school support services. Annual screening with PHQ-2 or 9 and MELIA-7 recommended with referral as appropriate.  ACCESS TO HEALTH CARE AND INSURANCE: This can be an issue for survivors over time. Our team has personnel with expertise in this area (e.g. social workers) who can assist with related barriers. You have had insurance through work and if anything changes please reach out to us for help.  DIET AND PHYSICAL ACTIVITY: The American Cancer Society recommends at least 150 minutes of moderate physical activity or 75 minutes of vigorous physical activity, or a combination of these, each week for adults. Children and adolescents should engage in at least 60 minutes each day of moderate to vigorous physical activity with vigorous activity at least 3 days each week. Survivors who have special needs can take part in most  activities, but the help of a PT or OT may be needed to adapt the activity for success. *Always consult with a Healthcare Professional before beginning any new exercise routines to ensure this is safe for you.  HEART HEALTH: Treatment with a class of medications called anthracyclines and/or radiation to the chest (including total body irradiation) can lead to problems with the heart. Heart problems may occur many years after treatment. There are several steps survivors can take to keep their heart healthy, including regular medical check-ups including blood pressure checks, and heart ultrasounds (called echocardiography). Based on your treatments, it is recommended you undergo echocardiogram screening every 2 years. If you notice new, changed or severe chest pain, unexplained shortness of breath, or unexplained swelling, please notify a medical provider immediately.   NEUROCOGNITIVE HEALTH: Many childhood cancer survivors are at risk for neurocognitive deficits as a result of the disruption to their early schooling for their necessary oncology treatments and from specific treatments including brain radiation and intra-thecal chemotherapy.  If any changes are noted regarding neurocognitive function (memory issues, information processing, comprehension, etc) or psychological health please let us and your primary care provider know immediately.You have not had issues identified in your education journey including high school and college.   BONE HEALTH:  Exposure to steroids, chemotherapy can increase risk for long-term poor bone health, such as decreased bone mineral density. This will has been assessed with a baseline DEXA scan and your most recent scan showed NORMAL bone density. It should be repeated on an as-needed basis moving forward. Vitamin D level should be checked regularly as well in case you need to take supplements. We recommend regular weight-bearing exercise, avoiding too much soda, and maintaining a  healthy diet.  DENTAL HEALTH: Cancer treatments can lead to an increased risk for dental decay (cavities) and routine preventative dental care should occur every 6 months with a dentist.    EYE HEALTH:  Exposures to certain therapies (steroids, methotrexate, radiation including the head, etc.) can increase risk for cataracts. Routine eye exams should be performed as part of long-term follow-up care, along with annual optometry or ophthalmology exams.  GASTROINTESTINAL HEALTH: Treatment of childhood cancers can cause scarring and chornic problems of the intestines or other parts of the gastrointestinal system.  The types of problems can vary depending on the treatment that was given.  Especially after radiation that was delivered to or around the abdomen, any severe or unexplained abdominal pain should be checked out by a medical provider immediately. Your colonoscopy included biopsies throughout the lower GI tract and all were normal without any adenomatous polyps.   LIVER HEALTH: Chemotherapies and radiation can impact your liver health. Baseline lab evaluation has been done (normal) and will only be followed in the future on an as needed basis if liver problems develop. Potential signs of liver impairment include but are not limited to: yellowing of the skin or the white part of the eyes.  KIDNEY AND BLADDER HEALTH: Chemotherapies and radiation can impact your kidney and bladder health. Baseline lab evaluation will be ordered and will only be followed in the future on an as needed basis if baseline abnormalities are seen or if kidney or bladder problems develop. Potential signs of kidney/bladder impairment include but are not limited to: high blood pressure, blood in the urine, pain with urination.  LUNG HEALTH:  As a result of certain treatments, particularly chest radiation, total body irradiation or chemo like bleomycin or busulfan chemotherapy, there is a risk of pulmonary problems. Pulmonary function tests  have been done as a baseline and should be repeated as needed in the future if symptoms occur.  PERIPHERAL NEUROPATHY: Certain chemotherapies may cause the hands or feet to hurt, tingle and feel numb or weak, due to damage to the peripheral nerves. There is no treatment that can cure or reverse nerve damage, treatment is directed toward symptom management.  Neurology or rehab services such as, physical therapy (PT) or occupational therapy (OT) may help manage symptoms.  IMMUNE HEALTH & VACCINES: You can receive any vaccines that your primary care provider (PCP) or Survivorship team recommends. Please confirm with your PCP that your vaccines are up to date, including the vaccine for HPV. As a cancer survivor, the HPV vaccination should be a 3-dose series.   METABOLIC HEALTH: Survivors are at increased risk for obesity, high cholesterol, and high blood pressure. A lipid panel for cholesterol and triglyceride screening should take place regularly so that interventions can be initiated promptly. These were reviewed and in acceptable ranges.  HORMONAL HEALTH: Survivors are at an increased risk for hormone changes including infertility.  Blood or bodily fluids may be tested if concerns are present.   SKIN HEALTH:  After radiation exposure, there is a risk of non-melanoma skin cancers.  Close attention to sun protection and annual follow-up with Dermatology is strongly recommended.  RISK OF SECONDARY CANCERS: Survivors of childhood cancer/BMT and genetic disorders are at increased risk for developing subsequent cancers compared to the general population. This is primarily due to exposure to radiation and certain chemotherapies, and for some individuals is secondary to an underlying genetic susceptibility. Regular physical examination is important and changes or concerns (new lumps, bumps, changes in medical status) should be reported to a primary care provider or to the Childhood Cancer Survivor Program.  In addition,  screening or monitoring for certain cancers is recommended for the following.    Skin Cancer: There is a risk of second cancers to the skin, soft tissues and bones within the field of prior radiation. Close attention to sun protection and annual follow-up with Dermatology is strongly recommended. , Leukemia/Myelodysplastic Syndrome: There is risk for second cancers of the blood such as acute leukemia or myelodysplasia due to certain chemo drugs. This risk is highest in the 5-10 years after exposure; thus, we will check blood counts annually for 10 years (long completed!). Any concerning signs or symptoms, including unexplained fevers, bleeding or bruising, extreme fatigue or enlarged glands/swollen lymph nodes should be shared with a medical provider., Breast Cancer: There is a risk of secondary breast cancer, early breast cancer screening is recommended.  Annual mammogram/MRI starting 8 years after chest radiation or age 25, whichever is later. You are up to date on this.       Total time spent on the following services on the date of the encounter: Preparing to see patient, chart review, review of outside records, Ordering medications, test, procedures, chemotherapy, Referring or communicating with other healthcare professionals, Interpretation of labs, imaging and other tests, Performing a medically appropriate examination , Counseling and educating the patient/family/caregiver , Documenting clinical information in the electronic or other health record , Communicating results to the patient/family/caregiver, Care coordination.  Total amount of time performed on this outpatient visit: 80 minutes.     Home Brooks MD

## 2024-06-15 ENCOUNTER — HEALTH MAINTENANCE LETTER (OUTPATIENT)
Age: 49
End: 2024-06-15

## 2024-12-10 ENCOUNTER — MYC MEDICAL ADVICE (OUTPATIENT)
Dept: ONCOLOGY | Facility: CLINIC | Age: 49
End: 2024-12-10
Payer: COMMERCIAL

## 2024-12-10 DIAGNOSIS — Z85.72 HISTORY OF NON-HODGKIN'S LYMPHOMA: ICD-10-CM

## 2024-12-10 DIAGNOSIS — Z92.3 HISTORY OF MANTLE FIELD RADIATION THERAPY: ICD-10-CM

## 2024-12-10 DIAGNOSIS — R92.8 ABNORMAL MAMMOGRAM: Primary | ICD-10-CM

## 2024-12-16 ENCOUNTER — ANCILLARY PROCEDURE (OUTPATIENT)
Dept: RADIOLOGY | Facility: CLINIC | Age: 49
End: 2024-12-16
Payer: COMMERCIAL

## 2024-12-17 ENCOUNTER — PATIENT OUTREACH (OUTPATIENT)
Dept: ONCOLOGY | Facility: CLINIC | Age: 49
End: 2024-12-17
Payer: COMMERCIAL

## 2024-12-18 ENCOUNTER — PRE VISIT (OUTPATIENT)
Dept: ONCOLOGY | Facility: CLINIC | Age: 49
End: 2024-12-18
Payer: COMMERCIAL

## 2024-12-18 NOTE — PROGRESS NOTES
New Patient Oncology Nurse Navigator Note     Referring provider: Home Brooks MD      Referring Clinic/Organization:  [facility] LONG TERM F/U      Referred to (specialty:) Breast Provider Referral      Date Referral Received: December 17, 2024     Evaluation for:  R92.8 (ICD-10-CM) - Abnormal mammogram  Z85.72 (ICD-10-CM) - History of non-Hodgkin's lymphoma  Z92.3 (ICD-10-CM) - History of mantle field radiation therapy     Clinical History (per Nurse review of records provided):      Jairo Prieto is a 49 year old female with history of non-Hodgkin's lymphoma and mantle field radiation therapy. She had bilteral screening mammograms at [facility] on 12/10/24 and an asymmetry in the left breast on the CC view medially at  posterior depth. RECOMMENDATION: Additional Mammographic Images and Possible Ultrasound.  IMAGING NEEDED    DOES SHE HAVE ANY DIAGNOSTIC IMAGING SCHEDULED OR ALREADY PERFORMED?     Records Location: Care Everywhere, Media, and See Bookmarked material     Records Needed: Breast imaging for past 5 years

## 2024-12-18 NOTE — TELEPHONE ENCOUNTER
Imaging Received  December 19, 2024 6:40 AM ABT   Action: Breast Images from  received and email sent to  Imaging team to resolve breast images to PACS.     RECORDS STATUS - BREAST    RECORDS REQUESTED FROM:    NOTES DETAILS STATUS   OFFICE NOTE from referring provider  Dr. Home Brooks   OFFICE NOTE from medical oncologist     OFFICE NOTE from surgeon     OFFICE NOTE from radiation oncologist     DISCHARGE SUMMARY from hospital     DISCHARGE REPORT from the ER     OPERATIVE REPORT     MEDICATION LIST     LABS     PATHOLOGY REPORTS  (Tissue diagnosis, Stage, ER/NM percentage positive and intensity of staining, HER2 IHC, FISH, and all biopsies from breast and any distant metastasis)                     PATHOLOGY [employer] TRACKING   Tracking #:    GENONOMIC TESTING     TYPE:   (Next Generation Sequencing, including Foundation One testing, and Oncotype score)     IMAGING (NEED IMAGES & REPORT)     CT SCANS     MRI PACS PACS:  04/04/24-06/06/12: MR Breast   MAMMO PACS/Req 12/18-HP HP:  12/16/24-03/22/16    PACS:  09/22/22, 04/15/21   ULTRASOUND Req 12/18-HP HP:  12/16/24-03/22/16: US Breast     PET     BONE SCAN PACS PACS:  04/04/24, 04/05/21   BRAIN MRI

## 2025-04-28 NOTE — PROGRESS NOTES
NEW CONSULTATION   Apr 30, 2025     Jairo Prieto is a 49 year old woman who presents with left breast asymmetry. She was referred by Dr. Brooks.    HPI:    Family history of paternal grandmother with breast cancer diagnosed at age 50.     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 She follows with Dr. Brooks.     She has does mammogram and breast MRI for screening. She had a screening mammogram 12/9/24 that demonstrated a left breast asymmetry. There was no ultrasound correlate. 6 month follow up diagnostic mammogram was recommended. Her most recent breast MRI was 4/4/24.     Today she denies any breast mass, skin change, nipple inversion or nipple discharge.     BREAST-SPECIFIC HISTORY:    Previous breast imaging: Yes  - 6/4/12 breast MRI BI-RADS 1  - 10/10/14 breast MRI BI-RADS 1  - 3/22/16 b/l Dmammo and left breast ultrasound for pain and nipple discharge: BI-RADS 1  - 5/8/18 Smammo: right breast asymmetry BI-RADS 0  - 5/17/18 right Dmammo and right breast ultrasound: BI-RADS 1  - 5/18/19 Smammo BI-RADS 1  - 7/14/20 Smammo BI-RADS 1  - 4/5/21 breast MRI: right breast 2:00 3 cm area of nonmass enhancement BI-RADS 4  - 4/15/21 right breast 2:00 MRI guided biopsy: normal breast tissue   - 12/26/21 breast MRI BI-RADS 1  - 9/22/22 Smammo BI-RADS 1  - 4/4/24 breast MRI BI-RADS 1  - 12/9/24 Smammo: left breast asymmetry BI-RADS 0  - 12/16/24 left Dmammo and left breast ultrasound: left medial posterior depth asymmetry BI-RDS 3, 6 month follow up mammogram recommended   - 4/30/25 left Dmammo and left breast ultrasound:     Prior breast biopsies/surgeries: Yes  - 4/15/21 right breast 2:00 MRI guided biopsy: normal breast tissue     Prior history of breast cancer or DCIS: No  Prior radiation history: yes, 1985 for T-cell lymphoma     GYN HISTORY:  G0  Age at menarche: 14  Menopausal: premenopausal    Menopausal hormone replacement therapy: No     RISK  ASSESSMENT:  Breast density: heterogeneously dense   > 20% lifetime risk    FAMILY HISTORY:  Breast ca: Yes  - paternal grandmother, 50  Ovarian ca: No  Pancreatic ca: No  Prostate: No  Gastric ca: No  Melanoma: No  Colon ca: No  Other cancer: No  Other genetic, testing, syndromes, or clotting disorders: no     PAST MEDICAL HISTORY  Past Medical History:   Diagnosis Date    Eczema of external ear     History of thyroidectomy, total 11/24/1992    Performed due to thyroid nodules s/p XRT. Parathyroid autotransplant done.    Non Hodgkin's lymphoma (H) Diagnosed 1985    Cumulative Anthracycline 270 mg/m2, Cyclophosphamide 4.2 gm/M2, Mantle irradiation 1500 cgy    Wrist fracture, left 2007    Normal DEXA after     PAST SURGICAL HISTORY   Past Surgical History:   Procedure Laterality Date    CENTRAL VENOUS CATHETER      Removed after therapy completed    COLONOSCOPY N/A 12/17/2020    Procedure: COLONOSCOPY, WITH POLYPECTOMY AND BIOPSY;  Surgeon: Cristian Villegas DO;  Location: [facility] OR    THYROIDECTOMY  1992     MEDICATIONS  Current Outpatient Medications   Medication Sig Dispense Refill    levothyroxine (SYNTHROID/LEVOTHROID) 150 MCG tablet Take 150 mcg by mouth every morning (before breakfast).      triamcinolone (KENALOG) 0.1 % external cream APPLY TO AFFECTED AREAS TWICE DAILY AS NEEDED FOR ITCHING 30 g 11    azelastine (ASTELIN) 0.1 % spray 1 spray      calcium-vitamin D 500-125 MG-UNIT TABS       cetirizine (ZYRTEC ALLERGY) 10 MG tablet Take 10 mg by mouth daily as needed.      Cholecalciferol 05461 UNIT TABS Take  by mouth. 3 wk followed by 1 week, 16 weeks total      ibuprofen (ADVIL,MOTRIN) 200 MG tablet Take 200 mg by mouth every 4 hours as needed. Approx. 2 tabs per day      levothyroxine (SYNTHROID, LEVOTHROID) 150 MCG tablet Take 150 mcg by mouth daily.      Multiple Vitamin (MULTI-VITAMIN PO) Take  by mouth. 2 tabs per day      OMEGA-3 FATTY ACIDS PO Take 2 g by mouth       ALLERGIES  Allergies   Allergen  "Reactions    Seasonal Allergies Itching     Sinus Infections      SOCIAL HISTORY:  Smokes: No, former   EtOH: couple per week  Exercise: minimal, desk job  Works for [employer]     ROS:   Change in vision No  Headaches: no  Respiratory: No shortness of breath, dyspnea on exertion, cough, or hemoptysis   Cardiovascular: negative   Gastrointestinal: negative Abdominal pain: no  Breast: negative  Musculoskeletal: negative Joint pain No Back pain: no  Psychiatric: negative  Hematologic/Lymphatic/Immunologic: negative  Endocrine: negative    EXAM  /85 (BP Location: Right arm, Patient Position: Sitting, Cuff Size: Adult Regular)   Pulse 84   Temp 98.3  F (36.8  C) (Oral)   Resp 20   Ht 1.845 m (6' 0.64\")   Wt 89.6 kg (197 lb 9.6 oz)   SpO2 99%   BMI 26.33 kg/m     PHYSICAL EXAM  Respiratory: breathing non labored.   Breasts: Examination was done in both the upright and supine positions.  Breasts are symmetrical . No masses noted. No skin or nipple changes. No nipple discharge. Right lateral breast scar well healed. Right port scar well healed.   No clavicular, cervical, or axillary lymphadenopathy.     INVESTIGATIONS:    4/30/25 left Dmammo and left breast ultrasound: Per Radiology mammogram is stable, ultrasound negative. Final report pending.     ASSESSMENT/PLAN:    Jairo Prieto is a 49 year old woman with history of chest radiation for lymphoma treatment and family history of breast cancer. A risk assessment was performed today. She meets NCCN guidelines for high risk screening. She had a mammogram in December, 2024 that demonstrated left breast asymmetry.   Mammogram today stable and ultrasound is without concerning findings. Follow up mammogram and possible ultrasound due in 6 months.     1) Left breast asymmetry.   - Bilateral diagnostic mammogram and possible left breast ultrasound due: 10/2025    2)History of chest radiation exposure and family history of breast cancer. She is at " increased risk for breast cancer and meets NCCN guidelines for high risk screening. Clinical breast exam every 6-12 months. Annual mammogram alternating with annual breast MRI.   - Be familiar with how your breast tissue normally feels and appears. Promptly report any changes.   - Breast MRI due and ordered.   - Mammogram with clinic visit planned for 10/2025    3) Lifestyle Modifications were provided.   - Maintain your best healthy weight. Higher body fat and adult weight gain is associated with increased risk for breast cancer. This increase in risk has been attributed to increase in circulating endogenous estrogen levels from fat tissue.   - Any alcohol intake increases the risk for breast cancer. If you choose to drink alcohol limit alcohol consumption to less than 1 drink (1 ounce of liquor, 6 ounces of wine, or 8 ounces of beer) per day or less than 3 drinks per week.  - Be active daily and void being sedentary.   - Vitamin D may decrease the risk of developing breast cancer.     Brie Daley PA-C    30 minutes spent on the date of the encounter doing chart review, review of test results, interpretation of tests, patient visit and documentation.

## 2025-04-30 ENCOUNTER — ANCILLARY PROCEDURE (OUTPATIENT)
Dept: MAMMOGRAPHY | Facility: CLINIC | Age: 50
End: 2025-04-30
Attending: PHYSICIAN ASSISTANT
Payer: COMMERCIAL

## 2025-04-30 ENCOUNTER — ONCOLOGY VISIT (OUTPATIENT)
Dept: SURGERY | Facility: CLINIC | Age: 50
End: 2025-04-30
Attending: INTERNAL MEDICINE
Payer: COMMERCIAL

## 2025-04-30 VITALS
WEIGHT: 197.6 LBS | DIASTOLIC BLOOD PRESSURE: 85 MMHG | OXYGEN SATURATION: 99 % | TEMPERATURE: 98.3 F | HEIGHT: 72 IN | RESPIRATION RATE: 20 BRPM | SYSTOLIC BLOOD PRESSURE: 132 MMHG | HEART RATE: 84 BPM | BODY MASS INDEX: 26.76 KG/M2

## 2025-04-30 DIAGNOSIS — Z92.3 HISTORY OF MANTLE FIELD RADIATION THERAPY: ICD-10-CM

## 2025-04-30 DIAGNOSIS — R92.8 CATEGORY 3 MAMMOGRAPHY RESULT WITH SHORT FOLLOW-UP INTERVAL SUGGESTED FOR PROBABLY BENIGN FINDING: ICD-10-CM

## 2025-04-30 DIAGNOSIS — R92.8 ABNORMAL MAMMOGRAM: ICD-10-CM

## 2025-04-30 DIAGNOSIS — Z91.89 AT HIGH RISK FOR BREAST CANCER: Primary | ICD-10-CM

## 2025-04-30 DIAGNOSIS — Z85.72 HISTORY OF NON-HODGKIN'S LYMPHOMA: ICD-10-CM

## 2025-04-30 PROCEDURE — 99214 OFFICE O/P EST MOD 30 MIN: CPT | Performed by: PHYSICIAN ASSISTANT

## 2025-04-30 PROCEDURE — 99213 OFFICE O/P EST LOW 20 MIN: CPT | Performed by: PHYSICIAN ASSISTANT

## 2025-04-30 PROCEDURE — 77065 DX MAMMO INCL CAD UNI: CPT | Mod: LT | Performed by: STUDENT IN AN ORGANIZED HEALTH CARE EDUCATION/TRAINING PROGRAM

## 2025-04-30 PROCEDURE — G0279 TOMOSYNTHESIS, MAMMO: HCPCS | Performed by: STUDENT IN AN ORGANIZED HEALTH CARE EDUCATION/TRAINING PROGRAM

## 2025-04-30 RX ORDER — LEVOTHYROXINE SODIUM 150 UG/1
150 TABLET ORAL
COMMUNITY

## 2025-04-30 ASSESSMENT — PAIN SCALES - GENERAL: PAINLEVEL_OUTOF10: NO PAIN (0)

## 2025-04-30 NOTE — LETTER
4/30/2025      Jairo Prieto  [address] [place] [address]      Dear Colleague,    Thank you for referring your patient, Jairo Prieto, to the [facility]. Please see a copy of my visit note below.    NEW CONSULTATION   Apr 30, 2025     Jairo Prieto is a 49 year old woman who presents with left breast asymmetry. She was referred by Dr. Brooks.    HPI:    Family history of paternal grandmother with breast cancer diagnosed at age 50.     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 She follows with Dr. Brooks.     She has does mammogram and breast MRI for screening. She had a screening mammogram 12/9/24 that demonstrated a left breast asymmetry. There was no ultrasound correlate. 6 month follow up diagnostic mammogram was recommended. Her most recent breast MRI was 4/4/24.     Today she denies any breast mass, skin change, nipple inversion or nipple discharge.     BREAST-SPECIFIC HISTORY:    Previous breast imaging: Yes  - 6/4/12 breast MRI BI-RADS 1  - 10/10/14 breast MRI BI-RADS 1  - 3/22/16 b/l Dmammo and left breast ultrasound for pain and nipple discharge: BI-RADS 1  - 5/8/18 Smammo: right breast asymmetry BI-RADS 0  - 5/17/18 right Dmammo and right breast ultrasound: BI-RADS 1  - 5/18/19 Smammo BI-RADS 1  - 7/14/20 Smammo BI-RADS 1  - 4/5/21 breast MRI: right breast 2:00 3 cm area of nonmass enhancement BI-RADS 4  - 4/15/21 right breast 2:00 MRI guided biopsy: normal breast tissue   - 12/26/21 breast MRI BI-RADS 1  - 9/22/22 Smammo BI-RADS 1  - 4/4/24 breast MRI BI-RADS 1  - 12/9/24 Smammo: left breast asymmetry BI-RADS 0  - 12/16/24 left Dmammo and left breast ultrasound: left medial posterior depth asymmetry BI-RDS 3, 6 month follow up mammogram recommended   - 4/30/25 left Dmammo and left breast ultrasound:     Prior breast biopsies/surgeries: Yes  - 4/15/21 right breast 2:00 MRI  guided biopsy: normal breast tissue     Prior history of breast cancer or DCIS: No  Prior radiation history: yes, 1985 for T-cell lymphoma     GYN HISTORY:  G0  Age at menarche: 14  Menopausal: premenopausal    Menopausal hormone replacement therapy: No     RISK ASSESSMENT:  Breast density: heterogeneously dense   > 20% lifetime risk    FAMILY HISTORY:  Breast ca: Yes  - paternal grandmother, 50  Ovarian ca: No  Pancreatic ca: No  Prostate: No  Gastric ca: No  Melanoma: No  Colon ca: No  Other cancer: No  Other genetic, testing, syndromes, or clotting disorders: no     PAST MEDICAL HISTORY  Past Medical History:   Diagnosis Date     Eczema of external ear      History of thyroidectomy, total 11/24/1992    Performed due to thyroid nodules s/p XRT. Parathyroid autotransplant done.     Non Hodgkin's lymphoma (H) Diagnosed 1985    Cumulative Anthracycline 270 mg/m2, Cyclophosphamide 4.2 gm/M2, Mantle irradiation 1500 cgy     Wrist fracture, left 2007    Normal DEXA after     PAST SURGICAL HISTORY   Past Surgical History:   Procedure Laterality Date     CENTRAL VENOUS CATHETER      Removed after therapy completed     COLONOSCOPY N/A 12/17/2020    Procedure: COLONOSCOPY, WITH POLYPECTOMY AND BIOPSY;  Surgeon: Cristian Villegas DO;  Location: [facility] OR     THYROIDECTOMY  1992     MEDICATIONS  Current Outpatient Medications   Medication Sig Dispense Refill     levothyroxine (SYNTHROID/LEVOTHROID) 150 MCG tablet Take 150 mcg by mouth every morning (before breakfast).       triamcinolone (KENALOG) 0.1 % external cream APPLY TO AFFECTED AREAS TWICE DAILY AS NEEDED FOR ITCHING 30 g 11     azelastine (ASTELIN) 0.1 % spray 1 spray       calcium-vitamin D 500-125 MG-UNIT TABS        cetirizine (ZYRTEC ALLERGY) 10 MG tablet Take 10 mg by mouth daily as needed.       Cholecalciferol 76674 UNIT TABS Take  by mouth. 3 wk followed by 1 week, 16 weeks total       ibuprofen (ADVIL,MOTRIN) 200 MG tablet Take 200 mg by mouth every 4 hours as  "needed. Approx. 2 tabs per day       levothyroxine (SYNTHROID, LEVOTHROID) 150 MCG tablet Take 150 mcg by mouth daily.       Multiple Vitamin (MULTI-VITAMIN PO) Take  by mouth. 2 tabs per day       OMEGA-3 FATTY ACIDS PO Take 2 g by mouth       ALLERGIES  Allergies   Allergen Reactions     Seasonal Allergies Itching     Sinus Infections      SOCIAL HISTORY:  Smokes: No, former   EtOH: couple per week  Exercise: minimal, desk job  Works for [employer]     ROS:   Change in vision No  Headaches: no  Respiratory: No shortness of breath, dyspnea on exertion, cough, or hemoptysis   Cardiovascular: negative   Gastrointestinal: negative Abdominal pain: no  Breast: negative  Musculoskeletal: negative Joint pain No Back pain: no  Psychiatric: negative  Hematologic/Lymphatic/Immunologic: negative  Endocrine: negative    EXAM  /85 (BP Location: Right arm, Patient Position: Sitting, Cuff Size: Adult Regular)   Pulse 84   Temp 98.3  F (36.8  C) (Oral)   Resp 20   Ht 1.845 m (6' 0.64\")   Wt 89.6 kg (197 lb 9.6 oz)   SpO2 99%   BMI 26.33 kg/m     PHYSICAL EXAM  Respiratory: breathing non labored.   Breasts: Examination was done in both the upright and supine positions.  Breasts are symmetrical . No masses noted. No skin or nipple changes. No nipple discharge. Right lateral breast scar well healed.   No clavicular, cervical, or axillary lymphadenopathy.     INVESTIGATIONS:    4/30/25 left Dmammo and left breast ultrasound: Per Radiology mammogram is stable, ultrasound negative. Final report pending.     ASSESSMENT/PLAN:    Jairo Mariemarcos is a 49 year old woman with history of chest radiation for lymphoma treatment and family history of breast cancer. A risk assessment was performed today. She meets NCCN guidelines for high risk screening. She had a mammogram in December, 2024 that demonstrated left breast asymmetry.   Mammogram today stable and ultrasound is without concerning findings. Follow up " mammogram and possible ultrasound due in 6 months.     1) Left breast asymmetry.   - Bilateral diagnostic mammogram and possible left breast ultrasound due: 10/2025    2)History of chest radiation exposure and family history of breast cancer. She is at increased risk for breast cancer and meets NCCN guidelines for high risk screening. Clinical breast exam every 6-12 months. Annual mammogram alternating with annual breast MRI.   - Screening mammogram with clinic visit due:   - Be familiar with how your breast tissue normally feels and appears. Promptly report any changes.   - Breast MRI due and ordered.   - Mammogram with clinic visit planned for 10/2025    3) Lifestyle Modifications were provided.   - Maintain your best healthy weight. Higher body fat and adult weight gain is associated with increased risk for breast cancer. This increase in risk has been attributed to increase in circulating endogenous estrogen levels from fat tissue.   - Any alcohol intake increases the risk for breast cancer. If you choose to drink alcohol limit alcohol consumption to less than 1 drink (1 ounce of liquor, 6 ounces of wine, or 8 ounces of beer) per day or less than 3 drinks per week.  - Be active daily and void being sedentary.   - Vitamin D may decrease the risk of developing breast cancer.     Brie Daley PA-C    30 minutes spent on the date of the encounter doing chart review, review of test results, interpretation of tests, patient visit and documentation.        Again, thank you for allowing me to participate in the care of your patient.        Sincerely,        Brie Daley PA-C    Electronically signed

## 2025-04-30 NOTE — PATIENT INSTRUCTIONS
Jairo Prieto is a 49 year old woman with history of chest radiation for lymphoma treatment and family history of breast cancer. A risk assessment was performed today. She meets NCCN guidelines for high risk screening. She had a mammogram in December, 2024 that demonstrated left breast asymmetry.   Mammogram today stable and ultrasound is without concerning findings. Follow up mammogram and possible ultrasound due in 6 months.     1) Left breast asymmetry.   - Bilateral diagnostic mammogram and possible left breast ultrasound due: 10/2025    2)History of chest radiation exposure and family history of breast cancer. She is at increased risk for breast cancer and meets NCCN guidelines for high risk screening. Clinical breast exam every 6-12 months. Annual mammogram alternating with annual breast MRI.   - Be familiar with how your breast tissue normally feels and appears. Promptly report any changes.   - Breast MRI due and ordered.   - Mammogram with clinic visit planned for 10/2025    3) Lifestyle Modifications were provided.   - Maintain your best healthy weight. Higher body fat and adult weight gain is associated with increased risk for breast cancer. This increase in risk has been attributed to increase in circulating endogenous estrogen levels from fat tissue.   - Any alcohol intake increases the risk for breast cancer. If you choose to drink alcohol limit alcohol consumption to less than 1 drink (1 ounce of liquor, 6 ounces of wine, or 8 ounces of beer) per day or less than 3 drinks per week.  - Be active daily and void being sedentary.   - Vitamin D may decrease the risk of developing breast cancer.

## 2025-04-30 NOTE — NURSING NOTE
"Oncology Rooming Note    April 30, 2025 8:19 AM   Jairo Prieto is a 49 year old female who presents for:    Chief Complaint   Patient presents with    Oncology Clinic Visit     Abnormal mammogram     Initial Vitals: /85 (BP Location: Right arm, Patient Position: Sitting, Cuff Size: Adult Regular)   Pulse 84   Temp 98.3  F (36.8  C) (Oral)   Resp 20   Ht 1.845 m (6' 0.64\")   Wt 89.6 kg (197 lb 9.6 oz)   SpO2 99%   BMI 26.33 kg/m   Estimated body mass index is 26.33 kg/m  as calculated from the following:    Height as of this encounter: 1.845 m (6' 0.64\").    Weight as of this encounter: 89.6 kg (197 lb 9.6 oz). Body surface area is 2.14 meters squared.  No Pain (0) Comment: Data Unavailable   No LMP recorded. Patient has had an ablation.  Allergies reviewed: Yes  Medications reviewed: Yes    Medications: Medication refills not needed today.  Pharmacy name entered into [facility]: [id number] [place] [id number] [address]    Frailty Screening:   Is the patient here for a new oncology consult visit in cancer care? 1. Yes. Over the past month, have you experienced difficulty or required a caregiver to assist with:   1. Balance, walking or general mobility (including any falls)? NO  2. Completion of self-care tasks such as bathing, dressing, toileting, grooming/hygiene?  NO  3. Concentration or memory that affects your daily life?  NO     PHQ9:  Did this patient require a PHQ9?: No      Clinical concerns:  none      Ladi Ybarra"

## 2025-07-06 ENCOUNTER — HEALTH MAINTENANCE LETTER (OUTPATIENT)
Age: 50
End: 2025-07-06

## (undated) DEVICE — KIT ENDO TURNOVER/PROCEDURE CARRY-ON 101822

## (undated) DEVICE — TUBING SUCTION 12"X1/4" N612

## (undated) DEVICE — SOL WATER IRRIG 1000ML BOTTLE 2F7114

## (undated) DEVICE — GOWN IMPERVIOUS 2XL BLUE

## (undated) DEVICE — SUCTION MANIFOLD NEPTUNE 2 SYS 1 PORT 702-025-000

## (undated) DEVICE — SPECIMEN CONTAINER 3OZ W/FORMALIN 59901

## (undated) RX ORDER — FENTANYL CITRATE 50 UG/ML
INJECTION, SOLUTION INTRAMUSCULAR; INTRAVENOUS
Status: DISPENSED
Start: 2020-12-17

## (undated) RX ORDER — DIPHENHYDRAMINE HYDROCHLORIDE 50 MG/ML
INJECTION INTRAMUSCULAR; INTRAVENOUS
Status: DISPENSED
Start: 2020-12-17

## (undated) RX ORDER — ONDANSETRON 2 MG/ML
INJECTION INTRAMUSCULAR; INTRAVENOUS
Status: DISPENSED
Start: 2020-12-17